# Patient Record
Sex: FEMALE | Race: WHITE | Employment: OTHER | URBAN - METROPOLITAN AREA
[De-identification: names, ages, dates, MRNs, and addresses within clinical notes are randomized per-mention and may not be internally consistent; named-entity substitution may affect disease eponyms.]

---

## 2022-09-09 ENCOUNTER — HOSPITAL ENCOUNTER (INPATIENT)
Age: 87
LOS: 6 days | Discharge: HOME | DRG: 177 | End: 2022-09-15
Attending: STUDENT IN AN ORGANIZED HEALTH CARE EDUCATION/TRAINING PROGRAM | Admitting: INTERNAL MEDICINE
Payer: COMMERCIAL

## 2022-09-09 ENCOUNTER — APPOINTMENT (OUTPATIENT)
Dept: GENERAL RADIOLOGY | Age: 87
DRG: 177 | End: 2022-09-09
Payer: COMMERCIAL

## 2022-09-09 DIAGNOSIS — U07.1 COVID: Primary | ICD-10-CM

## 2022-09-09 DIAGNOSIS — I50.9 ACUTE ON CHRONIC CONGESTIVE HEART FAILURE, UNSPECIFIED HEART FAILURE TYPE (HCC): ICD-10-CM

## 2022-09-09 PROBLEM — D72.829 LEUKOCYTOSIS: Status: ACTIVE | Noted: 2022-01-01

## 2022-09-09 PROBLEM — R79.89 ELEVATED SERUM CREATININE: Status: ACTIVE | Noted: 2022-01-01

## 2022-09-09 PROBLEM — R79.9 ELEVATED BUN: Status: ACTIVE | Noted: 2022-01-01

## 2022-09-09 PROBLEM — I50.41 ACUTE COMBINED SYSTOLIC AND DIASTOLIC CHF, NYHA CLASS 2 (HCC): Status: ACTIVE | Noted: 2022-01-01

## 2022-09-09 PROBLEM — D64.9 NORMOCYTIC NORMOCHROMIC ANEMIA: Status: ACTIVE | Noted: 2022-01-01

## 2022-09-09 LAB
A/G RATIO: 0.9 (ref 1.1–2.2)
ALBUMIN SERPL-MCNC: 3.4 G/DL (ref 3.4–5)
ALP BLD-CCNC: 228 U/L (ref 40–129)
ALT SERPL-CCNC: 21 U/L (ref 10–40)
ANION GAP SERPL CALCULATED.3IONS-SCNC: 16 MMOL/L (ref 3–16)
AST SERPL-CCNC: 28 U/L (ref 15–37)
BASE EXCESS VENOUS: -1.3 MMOL/L (ref -2–3)
BASOPHILS ABSOLUTE: 0 K/UL (ref 0–0.2)
BASOPHILS RELATIVE PERCENT: 0 %
BILIRUB SERPL-MCNC: 0.5 MG/DL (ref 0–1)
BUN BLDV-MCNC: 60 MG/DL (ref 7–20)
CALCIUM SERPL-MCNC: 9 MG/DL (ref 8.3–10.6)
CARBOXYHEMOGLOBIN: 1.2 % (ref 0–1.5)
CHLORIDE BLD-SCNC: 105 MMOL/L (ref 99–110)
CO2: 22 MMOL/L (ref 21–32)
CREAT SERPL-MCNC: 1.8 MG/DL (ref 0.6–1.2)
EKG ATRIAL RATE: 80 BPM
EKG DIAGNOSIS: NORMAL
EKG P AXIS: 43 DEGREES
EKG P-R INTERVAL: 174 MS
EKG Q-T INTERVAL: 394 MS
EKG QRS DURATION: 120 MS
EKG QTC CALCULATION (BAZETT): 454 MS
EKG R AXIS: -61 DEGREES
EKG T AXIS: 82 DEGREES
EKG VENTRICULAR RATE: 80 BPM
EOSINOPHILS ABSOLUTE: 0 K/UL (ref 0–0.6)
EOSINOPHILS RELATIVE PERCENT: 0 %
FERRITIN: 5347 NG/ML (ref 15–150)
GFR AFRICAN AMERICAN: 31
GFR NON-AFRICAN AMERICAN: 26
GLUCOSE BLD-MCNC: 124 MG/DL (ref 70–99)
HCO3 VENOUS: 24.1 MMOL/L (ref 24–28)
HCT VFR BLD CALC: 26.9 % (ref 36–48)
HCT VFR BLD CALC: 32.6 % (ref 36–48)
HEMOGLOBIN, VEN, REDUCED: 26.1 %
HEMOGLOBIN: 8.9 G/DL (ref 12–16)
IMMATURE RETIC FRACT: 0.5 (ref 0.21–0.37)
INFLUENZA A: NOT DETECTED
INFLUENZA B: NOT DETECTED
LACTATE DEHYDROGENASE: 375 U/L (ref 100–190)
LV EF: 63 %
LVEF MODALITY: NORMAL
LYMPHOCYTES ABSOLUTE: 0.8 K/UL (ref 1–5.1)
LYMPHOCYTES RELATIVE PERCENT: 4 %
MCH RBC QN AUTO: 32 PG (ref 26–34)
MCHC RBC AUTO-ENTMCNC: 33.1 G/DL (ref 31–36)
MCV RBC AUTO: 96.9 FL (ref 80–100)
METHEMOGLOBIN VENOUS: 0.5 % (ref 0–1.5)
MONOCYTES ABSOLUTE: 1 K/UL (ref 0–1.3)
MONOCYTES RELATIVE PERCENT: 5 %
NEUTROPHILS ABSOLUTE: 18.8 K/UL (ref 1.7–7.7)
NEUTROPHILS RELATIVE PERCENT: 91 %
O2 SAT, VEN: 73 %
PCO2, VEN: 42.4 MMHG (ref 41–51)
PDW BLD-RTO: 14.4 % (ref 12.4–15.4)
PH VENOUS: 7.36 (ref 7.35–7.45)
PLATELET # BLD: 310 K/UL (ref 135–450)
PMV BLD AUTO: 8.4 FL (ref 5–10.5)
PO2, VEN: 41.5 MMHG (ref 25–40)
POTASSIUM REFLEX MAGNESIUM: 4 MMOL/L (ref 3.5–5.1)
PRO-BNP: 3919 PG/ML (ref 0–449)
RBC # BLD: 2.78 M/UL (ref 4–5.2)
RBC # BLD: NORMAL 10*6/UL
RETICULOCYTE ABSOLUTE COUNT: 0.04 M/UL (ref 0.02–0.1)
RETICULOCYTE COUNT PCT: 1.08 % (ref 0.5–2.18)
SARS-COV-2 RNA, RT PCR: DETECTED
SODIUM BLD-SCNC: 143 MMOL/L (ref 136–145)
TCO2 CALC VENOUS: 25 MMOL/L
TOTAL PROTEIN: 7.1 G/DL (ref 6.4–8.2)
TROPONIN: 0.03 NG/ML
TROPONIN: 0.03 NG/ML
WBC # BLD: 20.7 K/UL (ref 4–11)

## 2022-09-09 PROCEDURE — 85025 COMPLETE CBC W/AUTO DIFF WBC: CPT

## 2022-09-09 PROCEDURE — 83540 ASSAY OF IRON: CPT

## 2022-09-09 PROCEDURE — 71045 X-RAY EXAM CHEST 1 VIEW: CPT

## 2022-09-09 PROCEDURE — 83921 ORGANIC ACID SINGLE QUANT: CPT

## 2022-09-09 PROCEDURE — 85045 AUTOMATED RETICULOCYTE COUNT: CPT

## 2022-09-09 PROCEDURE — 94761 N-INVAS EAR/PLS OXIMETRY MLT: CPT

## 2022-09-09 PROCEDURE — 6360000002 HC RX W HCPCS: Performed by: STUDENT IN AN ORGANIZED HEALTH CARE EDUCATION/TRAINING PROGRAM

## 2022-09-09 PROCEDURE — 2580000003 HC RX 258: Performed by: INTERNAL MEDICINE

## 2022-09-09 PROCEDURE — 80053 COMPREHEN METABOLIC PANEL: CPT

## 2022-09-09 PROCEDURE — 2700000000 HC OXYGEN THERAPY PER DAY

## 2022-09-09 PROCEDURE — 83880 ASSAY OF NATRIURETIC PEPTIDE: CPT

## 2022-09-09 PROCEDURE — 83550 IRON BINDING TEST: CPT

## 2022-09-09 PROCEDURE — 87636 SARSCOV2 & INF A&B AMP PRB: CPT

## 2022-09-09 PROCEDURE — 84484 ASSAY OF TROPONIN QUANT: CPT

## 2022-09-09 PROCEDURE — 82803 BLOOD GASES ANY COMBINATION: CPT

## 2022-09-09 PROCEDURE — 99285 EMERGENCY DEPT VISIT HI MDM: CPT

## 2022-09-09 PROCEDURE — 83615 LACTATE (LD) (LDH) ENZYME: CPT

## 2022-09-09 PROCEDURE — 1200000000 HC SEMI PRIVATE

## 2022-09-09 PROCEDURE — 93005 ELECTROCARDIOGRAM TRACING: CPT | Performed by: STUDENT IN AN ORGANIZED HEALTH CARE EDUCATION/TRAINING PROGRAM

## 2022-09-09 PROCEDURE — 36415 COLL VENOUS BLD VENIPUNCTURE: CPT

## 2022-09-09 PROCEDURE — 82728 ASSAY OF FERRITIN: CPT

## 2022-09-09 PROCEDURE — 99222 1ST HOSP IP/OBS MODERATE 55: CPT | Performed by: INTERNAL MEDICINE

## 2022-09-09 PROCEDURE — 93306 TTE W/DOPPLER COMPLETE: CPT

## 2022-09-09 RX ORDER — ONDANSETRON 4 MG/1
4 TABLET, ORALLY DISINTEGRATING ORAL EVERY 8 HOURS PRN
Status: DISCONTINUED | OUTPATIENT
Start: 2022-09-09 | End: 2022-09-15 | Stop reason: HOSPADM

## 2022-09-09 RX ORDER — BENZONATATE 100 MG/1
100 CAPSULE ORAL 3 TIMES DAILY PRN
Status: DISCONTINUED | OUTPATIENT
Start: 2022-09-09 | End: 2022-09-15 | Stop reason: HOSPADM

## 2022-09-09 RX ORDER — LOPERAMIDE HYDROCHLORIDE 2 MG/1
2 CAPSULE ORAL EVERY 4 HOURS PRN
COMMUNITY

## 2022-09-09 RX ORDER — ACETAMINOPHEN 650 MG/1
650 SUPPOSITORY RECTAL EVERY 6 HOURS PRN
Status: DISCONTINUED | OUTPATIENT
Start: 2022-09-09 | End: 2022-09-15 | Stop reason: HOSPADM

## 2022-09-09 RX ORDER — ACETAMINOPHEN 325 MG/1
650 TABLET ORAL 2 TIMES DAILY
COMMUNITY

## 2022-09-09 RX ORDER — IPRATROPIUM BROMIDE AND ALBUTEROL SULFATE 2.5; .5 MG/3ML; MG/3ML
1 SOLUTION RESPIRATORY (INHALATION) EVERY 4 HOURS PRN
COMMUNITY

## 2022-09-09 RX ORDER — ACETAMINOPHEN 325 MG/1
650 TABLET ORAL EVERY 6 HOURS PRN
Status: DISCONTINUED | OUTPATIENT
Start: 2022-09-09 | End: 2022-09-15 | Stop reason: HOSPADM

## 2022-09-09 RX ORDER — NIFEDIPINE 30 MG/1
30 TABLET, FILM COATED, EXTENDED RELEASE ORAL DAILY
COMMUNITY

## 2022-09-09 RX ORDER — FERROUS SULFATE 325(65) MG
325 TABLET ORAL
COMMUNITY

## 2022-09-09 RX ORDER — DOCUSATE SODIUM 100 MG/1
100 CAPSULE, LIQUID FILLED ORAL 2 TIMES DAILY
COMMUNITY

## 2022-09-09 RX ORDER — HEPARIN SODIUM 5000 [USP'U]/ML
5000 INJECTION, SOLUTION INTRAVENOUS; SUBCUTANEOUS EVERY 8 HOURS SCHEDULED
Status: DISCONTINUED | OUTPATIENT
Start: 2022-09-10 | End: 2022-09-15 | Stop reason: HOSPADM

## 2022-09-09 RX ORDER — FUROSEMIDE 10 MG/ML
20 INJECTION INTRAMUSCULAR; INTRAVENOUS ONCE
Status: COMPLETED | OUTPATIENT
Start: 2022-09-09 | End: 2022-09-09

## 2022-09-09 RX ORDER — LEVOTHYROXINE SODIUM 0.1 MG/1
100 TABLET ORAL DAILY
COMMUNITY

## 2022-09-09 RX ORDER — SODIUM CHLORIDE 9 MG/ML
INJECTION, SOLUTION INTRAVENOUS PRN
Status: DISCONTINUED | OUTPATIENT
Start: 2022-09-09 | End: 2022-09-15 | Stop reason: HOSPADM

## 2022-09-09 RX ORDER — ONDANSETRON 4 MG/1
4 TABLET, FILM COATED ORAL EVERY 8 HOURS PRN
COMMUNITY

## 2022-09-09 RX ORDER — ALPRAZOLAM 0.25 MG/1
0.25 TABLET ORAL 2 TIMES DAILY
COMMUNITY

## 2022-09-09 RX ORDER — SODIUM CHLORIDE 0.9 % (FLUSH) 0.9 %
5-40 SYRINGE (ML) INJECTION PRN
Status: DISCONTINUED | OUTPATIENT
Start: 2022-09-09 | End: 2022-09-15 | Stop reason: HOSPADM

## 2022-09-09 RX ORDER — GUAIFENESIN/DEXTROMETHORPHAN 100-10MG/5
5 SYRUP ORAL EVERY 4 HOURS PRN
Status: DISCONTINUED | OUTPATIENT
Start: 2022-09-09 | End: 2022-09-15 | Stop reason: HOSPADM

## 2022-09-09 RX ORDER — ECHINACEA PURPUREA EXTRACT 125 MG
1 TABLET ORAL EVERY 8 HOURS PRN
COMMUNITY

## 2022-09-09 RX ORDER — CHOLECALCIFEROL (VITAMIN D3) 125 MCG
5 CAPSULE ORAL NIGHTLY
COMMUNITY

## 2022-09-09 RX ORDER — DIPHENHYDRAMINE HCL 25 MG
25 TABLET ORAL EVERY 6 HOURS PRN
COMMUNITY

## 2022-09-09 RX ORDER — TRAMADOL HYDROCHLORIDE 50 MG/1
50 TABLET ORAL EVERY 8 HOURS PRN
COMMUNITY

## 2022-09-09 RX ORDER — FUROSEMIDE 20 MG/1
20 TABLET ORAL DAILY
COMMUNITY

## 2022-09-09 RX ORDER — SODIUM CHLORIDE 0.9 % (FLUSH) 0.9 %
5-40 SYRINGE (ML) INJECTION EVERY 12 HOURS SCHEDULED
Status: DISCONTINUED | OUTPATIENT
Start: 2022-09-09 | End: 2022-09-15 | Stop reason: HOSPADM

## 2022-09-09 RX ORDER — ONDANSETRON 2 MG/ML
4 INJECTION INTRAMUSCULAR; INTRAVENOUS EVERY 6 HOURS PRN
Status: DISCONTINUED | OUTPATIENT
Start: 2022-09-09 | End: 2022-09-15 | Stop reason: HOSPADM

## 2022-09-09 RX ORDER — POLYETHYLENE GLYCOL 3350 17 G/17G
17 POWDER, FOR SOLUTION ORAL DAILY PRN
Status: DISCONTINUED | OUTPATIENT
Start: 2022-09-09 | End: 2022-09-15 | Stop reason: HOSPADM

## 2022-09-09 RX ORDER — ENOXAPARIN SODIUM 100 MG/ML
30 INJECTION SUBCUTANEOUS DAILY
Status: DISCONTINUED | OUTPATIENT
Start: 2022-09-09 | End: 2022-09-09

## 2022-09-09 RX ADMIN — SODIUM CHLORIDE, PRESERVATIVE FREE 10 ML: 5 INJECTION INTRAVENOUS at 21:20

## 2022-09-09 RX ADMIN — FUROSEMIDE 20 MG: 10 INJECTION, SOLUTION INTRAMUSCULAR; INTRAVENOUS at 21:20

## 2022-09-09 ASSESSMENT — ENCOUNTER SYMPTOMS
CONSTIPATION: 0
COUGH: 1
SHORTNESS OF BREATH: 1
WHEEZING: 0
STRIDOR: 0
CHOKING: 0
VOMITING: 0
BACK PAIN: 0
DIARRHEA: 0

## 2022-09-09 ASSESSMENT — PAIN - FUNCTIONAL ASSESSMENT: PAIN_FUNCTIONAL_ASSESSMENT: NONE - DENIES PAIN

## 2022-09-09 NOTE — ED PROVIDER NOTES
[facility] RESIDENT NOTE       Date of evaluation: 9/9/2022    Chief Complaint     Shortness of Breath and Cough      of Present Illness     Ashley Ruiz is a 80 y.o. female, past medical history of hypertension, CHF, and hypothyroidism who presents to the emergency department via EMS from nursing home for evaluation of shortness of breath. Per EMS, patient has been experiencing shortness of breath for the last 2 to 3 days associated with a cough. Patient was noted to be hypoxic in the high 80s to low 90s on room air today, prompting presentation to the emergency department. On arrival, patient has a mild to moderately increased work of breathing but is unable to provide further history secondary to baseline mental status. Review of Systems   A complete review of systems was conducted on this patient and was negative except as noted in the HPI. Review of Systems   Constitutional:  Negative for chills and fever. Respiratory:  Positive for cough and shortness of breath. Negative for choking, wheezing and stridor. Cardiovascular:  Negative for chest pain. Gastrointestinal:  Negative for constipation, diarrhea and vomiting. Genitourinary:  Negative for frequency and hematuria. Musculoskeletal:  Negative for back pain and myalgias. Skin:  Negative for rash. Neurological:  Negative for seizures, syncope and facial asymmetry. Past Medical, Surgical, Family, and Social History     She has a past medical history of Anxiety, Arthritis, CHF (congestive heart failure) ([address] GERD (gastroesophageal reflux disease), Hyperlipidemia, Hypertension, and Thyroid disease. She has no past surgical history on file. Her family history is not on file. She reports that she does not currently use alcohol. She reports that she does not currently use drugs.     Medications     Previous Medications    No medications on file       Allergies     She has No Known Allergies. Physical Exam     INITIAL VITALS: BP: (!) 121/59, Temp: 98.9 °F (37.2 °C), Heart Rate: 79, Resp: 21, SpO2: 90 %   Physical Exam  Vitals and nursing note reviewed. Constitutional:       General: She is not in acute distress. Appearance: Normal appearance. She is normal weight. She is not ill-appearing or toxic-appearing. HENT:      Head: Normocephalic and atraumatic. Mouth/Throat:      Mouth: Mucous membranes are moist.   Eyes:      Extraocular Movements: Extraocular movements intact. Conjunctiva/sclera: Conjunctivae normal.   Cardiovascular:      Rate and Rhythm: Normal rate and regular rhythm. Pulses: Normal pulses. Heart sounds: Normal heart sounds. Pulmonary:      Effort: No respiratory distress. Breath sounds: No stridor. Rales (throughout bilateral lung fields) present. No wheezing. Chest:      Chest wall: No tenderness. Abdominal:      General: There is no distension. Palpations: Abdomen is soft. Tenderness: There is no abdominal tenderness. There is no guarding or rebound. Musculoskeletal:         General: Normal range of motion. Cervical back: Normal range of motion and neck supple. No rigidity. Skin:     General: Skin is warm and dry. Capillary Refill: Capillary refill takes less than 2 seconds. Findings: No rash. Neurological:      General: No focal deficit present. Mental Status: She is alert. Mental status is at baseline. DiagnosticResults     EKG   Interpreted in conjunction with emergencydepartment physician Sia Cross MD  Rhythm: normal sinus   Rate: normal  Axis: left  Ectopy: premature ventricular contractions (infrequent)  Conduction: normal  ST Segments: nonspecific changes  T Waves:elevation in  v2  Q Waves: none  Clinical Impression: non-specific EKG  Comparison:  No previous available for comparison    RADIOLOGY:  XR CHEST PORTABLE   Final Result   1. Pulmonary edema.    2.  Small left pleural effusion. 3.  Age-indeterminate fractures of left ribs 8 and 9. In correlation with point tenderness.           LABS:   Results for orders placed or performed during the hospital encounter of 09/09/22   COVID-19 & Influenza Combo    Specimen: Nasopharyngeal Swab   Result Value Ref Range    SARS-CoV-2 RNA, RT PCR DETECTED (A) NOT DETECTED    INFLUENZA A NOT DETECTED NOT DETECTED    INFLUENZA B NOT DETECTED NOT DETECTED   CBC with Auto Differential   Result Value Ref Range    WBC 20.7 (H) 4.0 - 11.0 K/uL    RBC 2.78 (L) 4.00 - 5.20 M/uL    Hemoglobin 8.9 (L) 12.0 - 16.0 g/dL    Hematocrit 26.9 (L) 36.0 - 48.0 %    MCV 96.9 80.0 - 100.0 fL    MCH 32.0 26.0 - 34.0 pg    MCHC 33.1 31.0 - 36.0 g/dL    RDW 14.4 12.4 - 15.4 %    Platelets 392 845 - 109 K/uL    MPV 8.4 5.0 - 10.5 fL    Neutrophils % 91.0 %    Lymphocytes % 4.0 %    Monocytes % 5.0 %    Eosinophils % 0.0 %    Basophils % 0.0 %    Neutrophils Absolute 18.8 (H) 1.7 - 7.7 K/uL    Lymphocytes Absolute 0.8 (L) 1.0 - 5.1 K/uL    Monocytes Absolute 1.0 0.0 - 1.3 K/uL    Eosinophils Absolute 0.0 0.0 - 0.6 K/uL    Basophils Absolute 0.0 0.0 - 0.2 K/uL    RBC Morphology Normal    CMP w/ Reflex to MG   Result Value Ref Range    Sodium 143 136 - 145 mmol/L    Potassium reflex Magnesium 4.0 3.5 - 5.1 mmol/L    Chloride 105 99 - 110 mmol/L    CO2 22 21 - 32 mmol/L    Anion Gap 16 3 - 16    Glucose 124 (H) 70 - 99 mg/dL    BUN 60 (H) 7 - 20 mg/dL    Creatinine 1.8 (H) 0.6 - 1.2 mg/dL    GFR Non-African American 26 (A) >60    GFR  31 (A) >60    Calcium 9.0 8.3 - 10.6 mg/dL    Total Protein 7.1 6.4 - 8.2 g/dL    Albumin 3.4 3.4 - 5.0 g/dL    Albumin/Globulin Ratio 0.9 (L) 1.1 - 2.2    Total Bilirubin 0.5 0.0 - 1.0 mg/dL    Alkaline Phosphatase 228 (H) 40 - 129 U/L    ALT 21 10 - 40 U/L    AST 28 15 - 37 U/L   Troponin   Result Value Ref Range    Troponin 0.03 (H) <0.01 ng/mL   Blood gas, venous (Lab)   Result Value Ref Range    pH, [place] 7.362 7.350 - 7.450 pCO2, [place] 42.4 41.0 - 51.0 mmHg    pO2, [place] 41.5 (H) 25.0 - 40.0 mmHg    HCO3, Venous 24.1 24.0 - 28.0 mmol/L    Base Excess, [place] -1.3 -2.0 - 3.0 mmol/L    O2 Sat, [place] 73 Not established %    Carboxyhemoglobin 1.2 0.0 - 1.5 %    MetHgb, [place] 0.5 0.0 - 1.5 %    TC02 (Calc), [place] 25 mmol/L    Hemoglobin, [place], Reduced 26.10 %   EKG 12 Lead   Result Value Ref Range    Ventricular Rate 80 BPM    Atrial Rate 80 BPM    P-R Interval 174 ms    QRS Duration 120 ms    Q-T Interval 394 ms    QTc Calculation (Bazett) 454 ms    P Axis 43 degrees    R Axis -61 degrees    T Axis 82 degrees    Diagnosis       EKG performed in ER and to be interpreted by ER physician. Confirmed by MD, ER ([id number]),  [address], [address] [place] [address] ([id number]) on 9/9/2022 12:21:58 PM         RECENT VITALS:  BP: (!) 119/57, Temp: 98.8 °F (37.1 °C), Heart Rate: 74,Resp: 21, SpO2: 100 %     ED Course     Nursing Notes, Past Medical Hx, Past Surgical Hx, Social Hx, Allergies, and Family Hx were reviewed.     The patient was given the followingmedications:  Orders Placed This Encounter   Medications    furosemide (LASIX) injection 20 mg    sodium chloride flush 0.9 % injection 5-40 mL    sodium chloride flush 0.9 % injection 5-40 mL    0.9 % sodium chloride infusion    enoxaparin Sodium (LOVENOX) injection 30 mg     Order Specific Question:   Indication of Use     Answer:   Prophylaxis-DVT/PE    OR Linked Order Group     ondansetron (ZOFRAN-ODT) disintegrating tablet 4 mg     ondansetron (ZOFRAN) injection 4 mg    polyethylene glycol (GLYCOLAX) packet 17 g    OR Linked Order Group     acetaminophen (TYLENOL) tablet 650 mg     acetaminophen (TYLENOL) suppository 650 mg    guaiFENesin-dextromethorphan (ROBITUSSIN DM) 100-10 MG/5ML syrup 5 mL       CONSULTS:  IP CONSULT TO HOSPITALIST  IP CONSULT TO [address] [place] [address] / MARCIA / Billy Kaba is a 80 y.o. female ast medical history of hypertension, CHF, and hypothyroidism presenting to emergency

## 2022-09-09 NOTE — H&P
Hospital Medicine History & Physical      PCP: Syeda Ghosh MD    Date of Admission: 9/9/2022    Date of Service: Pt seen/examined on 09/09/22 and Admitted to Inpatient with expected LOS greater than two midnights due to medical therapy. Chief Complaint: Shortness of breath and cough for about 2 to 3 days duration. History Of Present Illness: Rickie Sandifer is a 80 y.o. female with a past medical history of hypertension, hypothyroidism, and congestive heart failure, nursing home resident, presented to the [facility] emergency department by EMS from [facility] home for evaluation of shortness of breath. Reportedly patient has been experiencing difficulty breathing and shortness of breath about 2 to 3 days prior to admission, persistent, associated with cough, persistent cough, dry, no associated pain. Upon presentation she was noted to be hypoxic in the 80s to 90s on room air. Chest x-ray on presentation showed evidence of pulmonary edema, as such her hypoxemia was attributed to fluid overload. Other positive findings on presentation, positive for COVID-19 infection, elevated creatinine and BUN, bilirubinemia and leukocytosis. Admitted for further evaluation and management. Patient is very hard of hearing, unable to provide history. Past Medical History:          Diagnosis Date    Anxiety     Arthritis     CHF (congestive heart failure) (HCC)     GERD (gastroesophageal reflux disease)     Hyperlipidemia     Hypertension     Thyroid disease        Past Surgical History:      History reviewed. No pertinent surgical history. Medications Prior to Admission:      Prior to Admission medications    Not on File       Allergies:  Patient has no known allergies. Social History:      The patient currently lives in the nursing home    TOBACCO:   has no history on file for tobacco use. ETOH:   reports that she does not currently use alcohol.   E-cigarette/Vaping       Questions Responses E-cigarette/Vaping Use     Start Date     Passive Exposure     Quit Date     Counseling Given     Comments               Family History:      Attempted to review however unable to obtain, will not be pertinent to her current presentation. History reviewed. No pertinent family history. REVIEW OF SYSTEMS COMPLETED:   Unable to obtain    PHYSICAL EXAM PERFORMED:    BP (!) 119/57   Pulse 74   Temp 98.8 °F (37.1 °C) (Oral)   Resp 21   Ht 5' 6\" (1.676 m)   Wt 126 lb (57.2 kg)   SpO2 100%   BMI 20.34 kg/m²     General appearance: Patient is very hard of hearing, ill looking, frail and cachectic. HEENT:  Normal cephalic, atraumatic without obvious deformity. Extra ocular muscles intact. Conjunctivae/corneas clear. Neck: Supple, with full range of motion. No jugular venous distention. Trachea midline. Respiratory: Increased work of breathing, was coughing continuously upon my evaluation. Decreased air entry bilaterally. No wheezing. Cardiovascular:  Regular rate and rhythm with normal S1/S2 without murmurs, rubs or gallops. Abdomen: Soft, non-tender, non-distended with normal bowel sounds. Musculoskeletal:  No clubbing, cyanosis or edema bilaterally. Full range of motion without deformity. Skin: Skin color, texture, turgor normal.  No rashes or lesions. Neurologic: no Lateralizing signs on exam.. Psychiatric:  Alert not oriented,     Labs:     Recent Labs     09/09/22  0933   WBC 20.7*   HGB 8.9*   HCT 26.9*        Recent Labs     09/09/22  0933      K 4.0      CO2 22   BUN 60*   CREATININE 1.8*   CALCIUM 9.0     Recent Labs     09/09/22  0933   AST 28   ALT 21   BILITOT 0.5   ALKPHOS 228*     No results for input(s): INR in the last 72 hours.   Recent Labs     09/09/22  0933   TROPONINI 0.03*       Urinalysis:    No results found for: Hollie Lawson, BACTERIA, RBCUA, BLOODU, [address], [address]    Radiology:     CXR: I have reviewed the CXR with the following interpretation: Midline trachea, cephalization of pulmonary vasculature, no consolidation, small left pleural effusion, findings consistent with pulmonary edema. EKG:  I have reviewed the EKG with the following interpretation: Normal sinus rhythm, at a rate of 80 bpm. Normal QTc interval, PVCs noted. Left axis deviation. No ST elevation osteomyelitis. XR CHEST PORTABLE   Final Result   1. Pulmonary edema. 2.  Small left pleural effusion. 3.  Age-indeterminate fractures of left ribs 8 and 9. In correlation with point tenderness. US ABDOMEN LIMITED    (Results Pending)       Consults:    IP CONSULT TO HOSPITALIST  IP CONSULT TO CARDIOLOGY    ASSESSMENT:    25-year-old  lady with a past medical history as above, admitted with the following problems. Active Hospital Problems    Diagnosis Date Noted    Acute combined systolic and diastolic CHF, NYHA class 2 ([facility] [address] [I50.41] 09/09/2022     Priority: Medium    Acute COVID-19 [U07.1] 09/09/2022     Priority: Medium    Elevated BUN [R79.9] 09/09/2022     Priority: Medium    Elevated serum creatinine [R79.89] 09/09/2022     Priority: Medium    Normocytic normochromic anemia [D64.9] 09/09/2022     Priority: Medium    Leukocytosis [D72.829] 09/09/2022     Priority: Medium       PLAN:    #Acute hypoxic respiratory failure, likely due to acute exacerbation of chronic diastolic congestive heart failure, heart failure with preserved ejection fraction. -Given 1 dose of IV Lasix in the emergency department.  -We will hold further diuresis for now to assess kidney function response and volume status    #Elevated serum creatinine and BUN, with elevated BUN/creatinine ratio, no previous records to compare. Concern for possible chronic kidney disease, however unable to rule out TUTU. -Check renal ultrasound.  -Avoid further diuresis for now. #Acute COVID 19 infection.  -Hypoxemia improved with diuresis.   -Symptomatic control with antitussives and antipyretics  -Isolation. #Normocytic normochromic anemia. Most likely due to anemia of chronic kidney disease. However given elevated BUN/creatinine ratio, will check reticulocyte count, iron studies, and fecal occult blood test.    #Leukocytosis, with neutrophil predominance. Likely reactive. Will monitor. #Elevated troponin, mild, in the setting of volume overload, acute COVID infection and abnormal kidney function.  -We will trend      DVT Prophylaxis: Heparin subcu  Diet: ADULT DIET; Regular  Code Status: Full Code    PT/OT Eval Status: To be determined    Dispo -inpatient    High risk for complication given advanced age, acute COVID infection, and abnormal kidney function.      Garrison Velasquez MD

## 2022-09-09 NOTE — ED PROVIDER NOTES
ED Attending Attestation Note     Date of evaluation: 9/9/2022    This patient was seen by the resident. I have seen and examined the patient, agree with the workup, evaluation, management and diagnosis. The care plan has been discussed. Briefly, this is a patient who presents from her nursing home with concerns for a cough and some possible shortness of breath. She has not been COVID tested. On my evaluation, she is calm and in no acute distress. She has mild supraclavicular retractions but otherwise no significant increased work of breathing. Her lung sounds are noted to have crackles at the bilateral cases without wheezing.        Shirley Ng MD  09/10/22 9737

## 2022-09-09 NOTE — PROGRESS NOTES
4 Eyes Admission Assessment     I agree as the admission nurse that 2 RN's have performed a thorough Head to Toe Skin Assessment on the patient. ALL assessment sites listed below have been assessed on admission. Areas assessed by both nurses:   [x]   Head, Face, and Ears   [x]   Shoulders, Back, and Chest  [x]   Arms, Elbows, and Hands   [x]   Coccyx, Sacrum, and Ischum: purple bruise on buttocks  [x]   Legs, Feet, and Heels: anival heels red and blanchable:  purple bruise right upper calf        Does the Patient have Skin Breakdown?   No         Vu Prevention initiated:  Yes   Wound Care Orders initiated:  No      [facility] nurse consulted for Pressure Injury (Stage 3,4, Unstageable, DTI, NWPT, and Complex wounds):  No      Nurse 1 eSignature: Electronically signed by Khushi Lopes RN on 9/9/22 at 6:55 PM EDT    **SHARE this note so that the co-signing nurse is able to place an eSignature**    Nurse 2 eSignature: Electronically signed by Jelani Glass RN on 9/9/22 at 7:19 PM EDT

## 2022-09-10 LAB
A/G RATIO: 0.8 (ref 1.1–2.2)
ALBUMIN SERPL-MCNC: 3.3 G/DL (ref 3.4–5)
ALP BLD-CCNC: 222 U/L (ref 40–129)
ALT SERPL-CCNC: 19 U/L (ref 10–40)
ANION GAP SERPL CALCULATED.3IONS-SCNC: 22 MMOL/L (ref 3–16)
ANISOCYTOSIS: ABNORMAL
AST SERPL-CCNC: 25 U/L (ref 15–37)
BANDED NEUTROPHILS RELATIVE PERCENT: 5 % (ref 0–7)
BASOPHILS ABSOLUTE: 0 K/UL (ref 0–0.2)
BASOPHILS RELATIVE PERCENT: 0 %
BILIRUB SERPL-MCNC: 0.5 MG/DL (ref 0–1)
BUN BLDV-MCNC: 59 MG/DL (ref 7–20)
CALCIUM SERPL-MCNC: 9.3 MG/DL (ref 8.3–10.6)
CHLORIDE BLD-SCNC: 104 MMOL/L (ref 99–110)
CO2: 18 MMOL/L (ref 21–32)
CREAT SERPL-MCNC: 1.6 MG/DL (ref 0.6–1.2)
D DIMER: 4.32 UG/ML FEU (ref 0–0.6)
EOSINOPHILS ABSOLUTE: 0 K/UL (ref 0–0.6)
EOSINOPHILS RELATIVE PERCENT: 0 %
GFR AFRICAN AMERICAN: 36
GFR NON-AFRICAN AMERICAN: 30
GLUCOSE BLD-MCNC: 106 MG/DL (ref 70–99)
HCT VFR BLD CALC: 26.9 % (ref 36–48)
HEMOGLOBIN: 8.8 G/DL (ref 12–16)
IRON SATURATION: 41 % (ref 15–50)
IRON: 49 UG/DL (ref 37–145)
LYMPHOCYTES ABSOLUTE: 2.1 K/UL (ref 1–5.1)
LYMPHOCYTES RELATIVE PERCENT: 10 %
MCH RBC QN AUTO: 31.5 PG (ref 26–34)
MCHC RBC AUTO-ENTMCNC: 32.9 G/DL (ref 31–36)
MCV RBC AUTO: 95.6 FL (ref 80–100)
METAMYELOCYTES RELATIVE PERCENT: 2 %
MONOCYTES ABSOLUTE: 0.2 K/UL (ref 0–1.3)
MONOCYTES RELATIVE PERCENT: 1 %
MYELOCYTE PERCENT: 1 %
NEUTROPHILS ABSOLUTE: 18.6 K/UL (ref 1.7–7.7)
NEUTROPHILS RELATIVE PERCENT: 81 %
PDW BLD-RTO: 14.1 % (ref 12.4–15.4)
PLATELET # BLD: 320 K/UL (ref 135–450)
PMV BLD AUTO: 8.5 FL (ref 5–10.5)
POIKILOCYTES: ABNORMAL
POTASSIUM REFLEX MAGNESIUM: 3.7 MMOL/L (ref 3.5–5.1)
PRO-BNP: 4019 PG/ML (ref 0–449)
RBC # BLD: 2.81 M/UL (ref 4–5.2)
SODIUM BLD-SCNC: 144 MMOL/L (ref 136–145)
TOTAL IRON BINDING CAPACITY: 120 UG/DL (ref 260–445)
TOTAL PROTEIN: 7.2 G/DL (ref 6.4–8.2)
TROPONIN: 0.03 NG/ML
VITAMIN D 25-HYDROXY: 54.9 NG/ML
WBC # BLD: 20.9 K/UL (ref 4–11)

## 2022-09-10 PROCEDURE — 85379 FIBRIN DEGRADATION QUANT: CPT

## 2022-09-10 PROCEDURE — 6370000000 HC RX 637 (ALT 250 FOR IP): Performed by: INTERNAL MEDICINE

## 2022-09-10 PROCEDURE — 6360000002 HC RX W HCPCS: Performed by: INTERNAL MEDICINE

## 2022-09-10 PROCEDURE — 1200000000 HC SEMI PRIVATE

## 2022-09-10 PROCEDURE — 36415 COLL VENOUS BLD VENIPUNCTURE: CPT

## 2022-09-10 PROCEDURE — 83880 ASSAY OF NATRIURETIC PEPTIDE: CPT

## 2022-09-10 PROCEDURE — 82306 VITAMIN D 25 HYDROXY: CPT

## 2022-09-10 PROCEDURE — 85025 COMPLETE CBC W/AUTO DIFF WBC: CPT

## 2022-09-10 PROCEDURE — 99232 SBSQ HOSP IP/OBS MODERATE 35: CPT | Performed by: INTERNAL MEDICINE

## 2022-09-10 PROCEDURE — 84484 ASSAY OF TROPONIN QUANT: CPT

## 2022-09-10 PROCEDURE — 80053 COMPREHEN METABOLIC PANEL: CPT

## 2022-09-10 PROCEDURE — XW033E5 INTRODUCTION OF REMDESIVIR ANTI-INFECTIVE INTO PERIPHERAL VEIN, PERCUTANEOUS APPROACH, NEW TECHNOLOGY GROUP 5: ICD-10-PCS | Performed by: INTERNAL MEDICINE

## 2022-09-10 PROCEDURE — 2580000003 HC RX 258: Performed by: INTERNAL MEDICINE

## 2022-09-10 PROCEDURE — 92610 EVALUATE SWALLOWING FUNCTION: CPT

## 2022-09-10 RX ORDER — IPRATROPIUM BROMIDE AND ALBUTEROL SULFATE 2.5; .5 MG/3ML; MG/3ML
1 SOLUTION RESPIRATORY (INHALATION) EVERY 4 HOURS PRN
Status: DISCONTINUED | OUTPATIENT
Start: 2022-09-10 | End: 2022-09-15 | Stop reason: HOSPADM

## 2022-09-10 RX ORDER — 0.9 % SODIUM CHLORIDE 0.9 %
30 INTRAVENOUS SOLUTION INTRAVENOUS PRN
Status: DISCONTINUED | OUTPATIENT
Start: 2022-09-10 | End: 2022-09-15 | Stop reason: HOSPADM

## 2022-09-10 RX ORDER — NIFEDIPINE 30 MG/1
30 TABLET, FILM COATED, EXTENDED RELEASE ORAL DAILY
Status: DISCONTINUED | OUTPATIENT
Start: 2022-09-11 | End: 2022-09-11

## 2022-09-10 RX ORDER — ALPRAZOLAM 0.25 MG/1
0.25 TABLET ORAL 2 TIMES DAILY
Status: DISCONTINUED | OUTPATIENT
Start: 2022-09-10 | End: 2022-09-15 | Stop reason: HOSPADM

## 2022-09-10 RX ORDER — TRAMADOL HYDROCHLORIDE 50 MG/1
50 TABLET ORAL 3 TIMES DAILY
Status: DISCONTINUED | OUTPATIENT
Start: 2022-09-10 | End: 2022-09-15 | Stop reason: HOSPADM

## 2022-09-10 RX ORDER — LEVOTHYROXINE SODIUM 0.1 MG/1
100 TABLET ORAL DAILY
Status: DISCONTINUED | OUTPATIENT
Start: 2022-09-10 | End: 2022-09-15 | Stop reason: HOSPADM

## 2022-09-10 RX ORDER — DOCUSATE SODIUM 100 MG/1
100 CAPSULE, LIQUID FILLED ORAL 2 TIMES DAILY
Status: DISCONTINUED | OUTPATIENT
Start: 2022-09-10 | End: 2022-09-15 | Stop reason: HOSPADM

## 2022-09-10 RX ORDER — MECOBALAMIN 5000 MCG
5 TABLET,DISINTEGRATING ORAL NIGHTLY
Status: DISCONTINUED | OUTPATIENT
Start: 2022-09-10 | End: 2022-09-15 | Stop reason: HOSPADM

## 2022-09-10 RX ORDER — DIPHENHYDRAMINE HCL 25 MG
25 TABLET ORAL EVERY 6 HOURS PRN
Status: DISCONTINUED | OUTPATIENT
Start: 2022-09-10 | End: 2022-09-15 | Stop reason: HOSPADM

## 2022-09-10 RX ORDER — FERROUS SULFATE 325(65) MG
325 TABLET ORAL
Status: DISCONTINUED | OUTPATIENT
Start: 2022-09-10 | End: 2022-09-15 | Stop reason: HOSPADM

## 2022-09-10 RX ORDER — FUROSEMIDE 20 MG/1
20 TABLET ORAL DAILY
Status: DISCONTINUED | OUTPATIENT
Start: 2022-09-10 | End: 2022-09-11

## 2022-09-10 RX ADMIN — TRAMADOL HYDROCHLORIDE 50 MG: 50 TABLET, COATED ORAL at 18:45

## 2022-09-10 RX ADMIN — SODIUM CHLORIDE, PRESERVATIVE FREE 10 ML: 5 INJECTION INTRAVENOUS at 18:59

## 2022-09-10 RX ADMIN — HEPARIN SODIUM 5000 UNITS: 5000 INJECTION INTRAVENOUS; SUBCUTANEOUS at 06:24

## 2022-09-10 RX ADMIN — ALPRAZOLAM 0.25 MG: 0.25 TABLET ORAL at 18:44

## 2022-09-10 RX ADMIN — GUAIFENESIN SYRUP AND DEXTROMETHORPHAN 5 ML: 100; 10 SYRUP ORAL at 18:45

## 2022-09-10 RX ADMIN — BENZONATATE 100 MG: 100 CAPSULE ORAL at 11:58

## 2022-09-10 RX ADMIN — HEPARIN SODIUM 5000 UNITS: 5000 INJECTION INTRAVENOUS; SUBCUTANEOUS at 21:07

## 2022-09-10 RX ADMIN — FERROUS SULFATE TAB 325 MG (65 MG ELEMENTAL FE) 325 MG: 325 (65 FE) TAB at 18:45

## 2022-09-10 RX ADMIN — ALPRAZOLAM 0.25 MG: 0.25 TABLET ORAL at 21:07

## 2022-09-10 RX ADMIN — Medication 5 MG: at 21:06

## 2022-09-10 RX ADMIN — HEPARIN SODIUM 5000 UNITS: 5000 INJECTION INTRAVENOUS; SUBCUTANEOUS at 17:06

## 2022-09-10 RX ADMIN — SODIUM CHLORIDE, PRESERVATIVE FREE 10 ML: 5 INJECTION INTRAVENOUS at 21:07

## 2022-09-10 RX ADMIN — REMDESIVIR 200 MG: 100 INJECTION, POWDER, LYOPHILIZED, FOR SOLUTION INTRAVENOUS at 17:09

## 2022-09-10 RX ADMIN — TRAMADOL HYDROCHLORIDE 50 MG: 50 TABLET, COATED ORAL at 21:07

## 2022-09-10 RX ADMIN — GUAIFENESIN SYRUP AND DEXTROMETHORPHAN 5 ML: 100; 10 SYRUP ORAL at 11:58

## 2022-09-10 NOTE — DISCHARGE INSTRUCTIONS
Extra Heart Failure sites:   [email or web address]swiftQueue/ --- this is American Heart Association interactive Healthier Living with Heart Failure guidebook. Please copy and paste link into search bar. Use your mouse to scroll through the pages. Lots and lots of info / tips    HF Golden palomo  --- free smart phone palomo available for [employer] and [employer]. Use your phone to track sodium / fluid intake,  symptoms, weight, etc.    [employer] - website-- [employer] is a dialysis company. All dialysis patients follow a renal diet which IS low sodium!! This website offers free seasonal cookbooks.   Each quarter, they will release 25-30 new recipes with a breakdown of calories, sodium, glucose, etc    [email or web address] -- more free recipes

## 2022-09-10 NOTE — PLAN OF CARE
Problem: Skin/Tissue Integrity  Goal: Absence of new skin breakdown  Description: 1. Monitor for areas of redness and/or skin breakdown  2. Assess vascular access sites hourly  3. Every 4-6 hours minimum:  Change oxygen saturation probe site  4. Every 4-6 hours:  If on nasal continuous positive airway pressure, respiratory therapy assess nares and determine need for appliance change or resting period.   9/10/2022 0635 by Kristen Bermudez RN  Outcome: Progressing     Problem: ABCDS Injury Assessment  Goal: Absence of physical injury  9/10/2022 1434 by Fariba Mahan RN  Outcome: Progressing     Problem: Respiratory - Adult  Goal: Achieves optimal ventilation and oxygenation  9/10/2022 1434 by Fariba Mahan RN  Outcome: Progressing     Problem: Cardiovascular - Adult  Goal: Maintains optimal cardiac output and hemodynamic stability  9/10/2022 1434 by Fariba Mahan RN  Outcome: Progressing     Problem: Cardiovascular - Adult  Goal: Absence of cardiac dysrhythmias or at baseline  9/10/2022 1434 by Fariba Mahan RN  Outcome: Progressing     Problem: Metabolic/Fluid and Electrolytes - Adult  Goal: Electrolytes maintained within normal limits  9/10/2022 1434 by Fariba Mahan RN  Outcome: Progressing     Problem: Metabolic/Fluid and Electrolytes - Adult  Goal: Hemodynamic stability and optimal renal function maintained  9/10/2022 1434 by Fariba Mahan RN  Outcome: Progressing

## 2022-09-10 NOTE — CONSULTS
Cardiology Consultation/History and Physical                                                                  Pt Name: Manny Mark  Age: 80 y.o. Sex: female  : 1924  Location: [facility]5/4315-01    Referring Physician: Walker Zavala MD      Reason for Consult:     Reason for Consultation/Chief Complaint: sob    HPI:      Manny Mark is a 80 y.o. female with dementia that presented to ED with sob and hypoxia form nursing home. Unable to obtain medical history from the patient. Apparently, she was noted to have worsening sob and cough. WBC elevated, COVID positive        ECHO 2022  Technically difficult. Left ventricular cavity size is normal. Normal left ventricular wall thickness. Ejection fraction is visually estimated to be 60-65%. No regional wall motion abnormalities are noted. Mild tricuspid regurgitation. Mild eccentric mitral regurgitation is present. The left atrium is mildly dilated. IVC size is dilated (>2.1 cm) and collapses < 50% with respiration consistent with elevated RA pressure (15 mmHg)with and  estimated RSVP of 45mmHg. Histories     Past Medical History:   has a past medical history of Anxiety, Arthritis, CHF (congestive heart failure) ([address] GERD (gastroesophageal reflux disease), Hyperlipidemia, Hypertension, and Thyroid disease. Surgical History:   has no past surgical history on file. Social History:   reports that she does not currently use alcohol. She reports that she does not currently use drugs. Family History:  No evidence for sudden cardiac death or premature CAD      Medications:       Home Medications  Were reviewed and are listed in nursing record. and/or listed below  Prior to Admission medications    Medication Sig Start Date End Date Taking?  Authorizing Provider   acetaminophen (TYLENOL) 325 MG tablet Take 650 mg by mouth in the morning and at bedtime   Yes Historical Provider, MD   ALPRAZolam (XANAX) 0.25 MG tablet Take 0.25 mg by mouth in Historical Provider, MD   diclofenac sodium (VOLTAREN) 1 % GEL Apply 1 g topically 2 times daily as needed for Pain (right shoulder, elbow, and wrist)   Yes Historical Provider, MD          Inpatient Medications:   furosemide  20 mg IntraVENous Once    sodium chloride flush  5-40 mL IntraVENous 2 times per day    [START ON 9/10/2022] heparin (porcine)  5,000 Units SubCUTAneous 3 times per day       IV drips:   sodium chloride         PRN:  sodium chloride flush, sodium chloride, ondansetron **OR** ondansetron, polyethylene glycol, acetaminophen **OR** acetaminophen, guaiFENesin-dextromethorphan, benzonatate    Allergy:     Patient has no known allergies. Review of Systems:     Unable to perform due to underlying dementia, hearing loss    Physical Examination:     Vitals:    09/09/22 1705 09/09/22 1738 09/09/22 1800 09/09/22 1910   BP:  (!) 118/57 123/63 (!) 142/79   Pulse:  80 79 88   Resp:  22 24 23   Temp:  99 °F (37.2 °C)  97.8 °F (36.6 °C)   TempSrc:  Oral  Axillary   SpO2:  97%  93%   Weight: 126 lb (57.2 kg)      Height: 5' 6\" (1.676 m)          Wt Readings from Last 3 Encounters:   09/09/22 126 lb (57.2 kg)         General Appearance:  Alert, tachypneic  appears stated age    Head:  Normocephalic, without obvious abnormality, atraumatic   Eyes:  PERRL, conjunctiva/corneas clear EOM intact  Ears normal          Nose: Nares normal, no drainage or sinus tenderness   Throat: Lips, mucosa, and tongue normal   Neck: Supple, symmetrical, trachea midline, no adenopathy, thyroid: not enlarged, symmetric, no tenderness/mass/nodules, no carotid bruit.         Lungs:   Respirations labored, diminshed bs   Chest Wall:  No tenderness or deformity   Heart:  Regular rhythm, rate is controlled, S1, S2 normal, there is no murmur, there is no rub or gallop, cannot assess jvd, no bilateral lower extremity edema   Abdomen:   Soft, non-tender, bowel sounds active all four quadrants,  no masses, no organomegaly Extremities: Extremities normal, atraumatic, no cyanosis. Pulses: 2+ radial   Skin: Skin color, texture, turgor normal, no rashes or lesions   Pysch: Normal mood and affect   Neurologic: No focal deficits noted        Labs:     Recent Labs     09/09/22 0933      K 4.0   BUN 60*   CREATININE 1.8*      CO2 22   GLUCOSE 124*   CALCIUM 9.0     Recent Labs     09/09/22 0933   WBC 20.7*   HGB 8.9*   HCT 26.9*      MCV 96.9     No results for input(s): CHOLTOT, TRIG, HDL, CHOLHDL, LDL in the last 72 hours. Invalid input(s): Kalyani Wray  No results for input(s): PTT, INR in the last 72 hours. Invalid input(s): PT  Recent Labs     09/09/22 0933   TROPONINI 0.03*     No results for input(s): BNP in the last 72 hours. No results for input(s): TSH in the last 72 hours. No results for input(s): CHOL, HDL, LDLCALC, TRIG in the last 72 hours.]    Lab Results   Component Value Date    TROPONINI 0.03 (H) 09/09/2022     EKG nsr, anteroseptal mi age indeterminate    Imaging:       I have personally reviewed patient's ECG which shows EKG nsr, anteroseptal mi age indeterminate    Assessment / Plan:   Acute Hypoxemic respiratory failure  Leukocytosis  Pleural effusion  Diastolic HF vs COVID pneumonitis  CKD    Has received IV lasix  Tx of covid per hospitalist  Unfortunately, not a candidate for CTA and unable to perform VQ  US lower extremities to assess for DVT, d-dimer    I have personally reviewed the reports and images of labs, radiological studies, cardiac studies including ECG's and telemetry, current and old medical records. The note was completed using EMR and Dragon dictation system. Every effort was made to ensure accuracy; however, inadvertent computerized transcription errors may be present. I would like to thank you for providing me the opportunity to participate in the care of your patient. If you have any questions, please do not hesitate to contact me.      Rigoberto Garcia MD, [place] [address]  Ph: [phone]  Fax: [phone]

## 2022-09-10 NOTE — PROGRESS NOTES
Speech Language Pathology  Facility/Department: [place] [address] [facility]   CLINICAL BEDSIDE SWALLOW EVALUATION    NAME: Elizabeth Mooney  : 1924  MRN: [id number]    ADMISSION DATE: 2022  ADMITTING DIAGNOSIS: has Acute combined systolic and diastolic CHF, NYHA class 2 ([facility] [address] [address] Acute COVID-19; Elevated BUN; Elevated serum creatinine; Normocytic normochromic anemia; and Leukocytosis on their problem list.  ONSET DATE: 2022    Recent Chest Xray: (2022)  Impression   1. Pulmonary edema. 2.  Small left pleural effusion. 3.  Age-indeterminate fractures of left ribs 8 and 9. In correlation with point tenderness     Date of Eval: 9/10/2022  Evaluating Therapist: RICA Meadows    Current Diet level:  Current Diet : Regular      Primary Complaint  Patient Complaint: \"I want water. \"    Pain:  Pain Assessment  Pain Assessment: None - Denies Pain    Reason for Referral  Elizabeth Mooney was referred for a bedside swallow evaluation to assess the efficiency of her swallow function, identify signs and symptoms of aspiration and make recommendations regarding safe dietary consistencies, effective compensatory strategies, and safe eating environment. Prior Dysphagia History  None found in EMR. Per hard chart, was on regular texture diet and thin liquids at her facility. Impression  Dysphagia Diagnosis: Suspected needs further assessment  Dysphagia Impression : Needs further assessment. Oral motor exam limited by inconsistent direction following (pt very [place], confused). Limited dentition. Oral cavity appears moist, clean. With patient seated up in bed on room air, trialed ice chips, thins via tsp/cup/straw, puree. Pt with positive oral acceptance, positive swallow movement, good oral clearance. Inconsistent dry cough present (both with and without PO). Reduced with small, controlled straw sips.  Recommend frequent oral hygiene (x3 daily), and puree diet, thin liquids via straw (small sips only), monitoring tolerance. Recommend instrumental assessment as appropriate (unable at this time d/t weekend scheduling/staffing limitations). Will continue to follow. Dysphagia Outcome Severity Scale: Level 4: Mild moderate dysphagia- Intermittent supervision/cueing. One - two diet consistencies restricted     Treatment Plan  Requires SLP Intervention: Yes  Duration of Treatment: 1-2 wks or LOS  D/C Recommendations: Ongoing speech therapy is recommended during this hospitalization       Recommended Diet and Intervention  Puree + thin liquids via SMALL STRAW SIPS  Recommended Form of Meds: Crushed in puree as able  Recommendations: Dysphagia treatment; Modified barium swallow study  Therapeutic Interventions: Diet tolerance monitoring;Oral care; Patient/Family education    Compensatory Swallowing Strategies  Compensatory Swallowing Strategies : Upright as possible for all oral intake;Eat/Feed slowly; Alternate solids and liquids;Small bites/sips;Assist feed;Remain upright for 30-45 minutes after meals    Treatment/Goals  Short-term Goals  Timeframe for Short-term Goals: 1-2 wks or LOS  Goal 1: Patient will participate in further assessment of swallow function as appropriate. General  Chart Reviewed: Yes  Comments: Per admitting H&P (09/09/2022): 'Pricilla Jain is a 80 y.o. female with a past medical history of hypertension, hypothyroidism, and congestive heart failure, nursing home resident, presented to the [facility] emergency department by EMS from nursing home for evaluation of shortness of breath. Reportedly patient has been experiencing difficulty breathing and shortness of breath about 2 to 3 days prior to admission, persistent, associated with cough, persistent cough, dry, no associated pain. Upon presentation she was noted to be hypoxic in the 80s to 90s on room air. Chest x-ray on presentation showed evidence of pulmonary edema, as such her hypoxemia was attributed to fluid overload.   Other positive findings on

## 2022-09-10 NOTE — PLAN OF CARE
Problem: Skin/Tissue Integrity  Goal: Absence of new skin breakdown  Description: 1. Monitor for areas of redness and/or skin breakdown  2. Assess vascular access sites hourly  3. Every 4-6 hours minimum:  Change oxygen saturation probe site  4. Every 4-6 hours:  If on nasal continuous positive airway pressure, respiratory therapy assess nares and determine need for appliance change or resting period.   Outcome: Progressing     Problem: ABCDS Injury Assessment  Goal: Absence of physical injury  Outcome: Progressing  Flowsheets (Taken 9/9/2022 2000)  Absence of Physical Injury: Implement safety measures based on patient assessment     Problem: Respiratory - Adult  Goal: Achieves optimal ventilation and oxygenation  Outcome: Progressing  Flowsheets  Taken 9/10/2022 0416  Achieves optimal ventilation and oxygenation:   Assess for changes in mentation and behavior   Position to facilitate oxygenation and minimize respiratory effort   Oxygen supplementation based on oxygen saturation or arterial blood gases   Initiate smoking cessation protocol as indicated   Encourage broncho-pulmonary hygiene including cough, deep breathe, incentive spirometry  Taken 9/9/2022 2058  Achieves optimal ventilation and oxygenation:   Assess for changes in mentation and behavior   Position to facilitate oxygenation and minimize respiratory effort   Oxygen supplementation based on oxygen saturation or arterial blood gases   Assess the need for suctioning and aspirate as needed     Problem: Discharge Planning  Goal: Discharge to home or other facility with appropriate resources  Outcome: Progressing  Flowsheets (Taken 9/9/2022 2058)  Discharge to home or other facility with appropriate resources:   Arrange for needed discharge resources and transportation as appropriate   Identify discharge learning needs (meds, wound care, etc)   Arrange for interpreters to assist at discharge as needed   Refer to discharge planning if patient needs

## 2022-09-10 NOTE — PROGRESS NOTES
[facility]   Cardiology Progress Note     Admit Date: 2022     Reason for follow up: CHF    HPI and Interval History:   Patient seen and examined. Clinical notes reviewed. Telemetry reviewed. No new complaint today. Patient very hard of hearing and minimally interacting  Does not offer any complaints  No major events overnight. Review of System:  Limited as patient is not offering any complaints    Physical Examination:  Vitals:    09/10/22 0927   BP: (!) 166/91   Pulse: 85   Resp: 18   Temp:    SpO2: 93%        Intake/Output Summary (Last 24 hours) at 9/10/2022 1106  Last data filed at 9/10/2022 0416  Gross per 24 hour   Intake 0 ml   Output 650 ml   Net -650 ml     In: 0   Out: 650    Wt Readings from Last 3 Encounters:   09/10/22 110 lb 3.7 oz (50 kg)     Temp  Av.1 °F (36.7 °C)  Min: 97.4 °F (36.3 °C)  Max: 99 °F (37.2 °C)  Pulse  Av.9  Min: 74  Max: 88  BP  Min: 118/57  Max: 166/91  SpO2  Av %  Min: 93 %  Max: 100 %  FiO2   Av %  Min: 28 %  Max: 28 %    Telemetry: Sinus rhythm   Constitutional: Alert. No distress. Head: Normocephalic and atraumatic. Mouth/Throat: Lips appear moist. Oropharynx is clear and moist.  Eyes: Conjunctivae normal. EOM are normal.   Neck: Neck supple. No lymphadenopathy. No rigidity. No JVD present. Cardiovascular: Normal rate, regular rhythm. Normal S1&S2. Carotid pulse 2+ bilaterally. Pulmonary/Chest: Bilateral respiratory sounds present. No respiratory accessory muscle use. No wheezes, No rhonchi. Abdominal: Soft. Normal bowel sounds present. No distension, No tenderness. No splenomegaly. No hernia. Musculoskeletal: No tenderness. No edema    Lymphadenopathy: Has no cervical adenopathy. Neurological: Alert and oriented. Cranial nerve II-XII grossly intact, No gross deficit to touch. Skin: Skin is warm and dry. No rash, lesions, ulcerations noted. Psychiatric: No anxiety nor agitation.     Labs, diagnostic and imaging results reviewed. Reviewed. Recent Labs     09/09/22  0933 09/10/22  0523    144   K 4.0 3.7    104   CO2 22 18*   BUN 60* 59*   CREATININE 1.8* 1.6*     Recent Labs     09/09/22  0933 09/09/22  2031 09/10/22  0523   WBC 20.7*  --  20.9*   HGB 8.9*  --  8.8*   HCT 26.9* 32.6* 26.9*   MCV 96.9  --  95.6     --  320     Lab Results   Component Value Date/Time    TROPONINI 0.03 09/10/2022 05:23 AM     Estimated Creatinine Clearance: 16 mL/min (A) (based on SCr of 1.6 mg/dL (H)).    No results found for: BNP  No results found for: PROTIME, INR  No results found for: CHOL, HDL, TRIG    Scheduled Meds:   ALPRAZolam  0.25 mg Oral BID    docusate sodium  100 mg Oral BID    ferrous sulfate  325 mg Oral Daily with breakfast    furosemide  20 mg Oral Daily    levothyroxine  100 mcg Oral Daily    melatonin  5 mg Oral Nightly    traMADol  50 mg Oral TID    sodium chloride flush  5-40 mL IntraVENous 2 times per day    heparin (porcine)  5,000 Units SubCUTAneous 3 times per day     Continuous Infusions:   sodium chloride       PRN Meds:diclofenac sodium, diphenhydrAMINE, ipratropium-albuterol, sodium chloride, sodium chloride flush, sodium chloride, ondansetron **OR** ondansetron, polyethylene glycol, acetaminophen **OR** acetaminophen, guaiFENesin-dextromethorphan, benzonatate     Patient Active Problem List    Diagnosis Date Noted    Acute combined systolic and diastolic CHF, NYHA class 2 ([facility] 09/09/2022    Acute COVID-19 09/09/2022    Elevated BUN 09/09/2022    Elevated serum creatinine 09/09/2022    Normocytic normochromic anemia 09/09/2022    Leukocytosis 09/09/2022      Active Hospital Problems    Diagnosis Date Noted    Acute combined systolic and diastolic CHF, NYHA class 2 ([facility] [I50.41] 09/09/2022     Priority: Medium    Acute COVID-19 [U07.1] 09/09/2022     Priority: Medium    Elevated BUN [R79.9] 09/09/2022     Priority: Medium    Elevated serum creatinine [R79.89] 09/09/2022     Priority: Medium Normocytic normochromic anemia [D64.9] 09/09/2022     Priority: Medium    Leukocytosis [D72.829] 09/09/2022     Priority: Medium       Assessment and Plan:     COVID-19 infection, leukocytosis  HfpEF  Poorly controlled hypertension  Pulmonary hypertension, RVSP 45 mmHg    Recommendations:  Continue p.o. Lasix  Resume home medications to treat the blood pressure -nifedipine 30 mg daily  Treat her COVID-19 infection    Cardiology will sign off  Please call with any questions      Thank you for allowing me to participate in the care of this patient. If you have any questions, please do not hesitate to contact me. Lindy Roberts MD   Cardiac Electrophysiology  [address] [facility]  Office [phone]    For any EP related issues after 5 PM, contact [facility] on call cardiology through .

## 2022-09-10 NOTE — PROGRESS NOTES
Hospital Medicine History & Physical      PCP: Radha Lock MD    Date of Admission: 9/9/2022    Initial History Of Present Illness: Christin Collet is a 80 y.o. female with a past medical history of hypertension, hypothyroidism, and congestive heart failure, nursing home resident, presented to the [facility] emergency department by EMS from nursing home for evaluation of shortness of breath. Reportedly patient has been experiencing difficulty breathing and shortness of breath about 2 to 3 days prior to admission, persistent, associated with cough, persistent cough, dry, no associated pain. Upon presentation she was noted to be hypoxic in the 80s to 90s on room air. Chest x-ray on presentation showed evidence of pulmonary edema, as such her hypoxemia was attributed to fluid overload. Other positive findings on presentation, positive for COVID-19 infection, elevated creatinine and BUN, bilirubinemia and leukocytosis. Admitted for further evaluation and management. Patient is very hard of hearing, unable to provide history. Interval Hx:  Patient was seen and examined at bedside, alert, very hard of hearing. Persistent dry cough noted. Frail, ill looking    Past Medical History:          Diagnosis Date    Anxiety     Arthritis     CHF (congestive heart failure) (HCC)     GERD (gastroesophageal reflux disease)     Hyperlipidemia     Hypertension     Thyroid disease        Past Surgical History:      History reviewed. No pertinent surgical history. Medications Prior to Admission:      Prior to Admission medications    Medication Sig Start Date End Date Taking? Authorizing Provider   acetaminophen (TYLENOL) 325 MG tablet Take 650 mg by mouth in the morning and at bedtime   Yes Historical Provider, MD   ALPRAZolam (XANAX) 0.25 MG tablet Take 0.25 mg by mouth in the morning and at bedtime.    Yes Historical Provider, MD   Menthol-Zinc Oxide (CALMOSEPTINE EX) Apply topically in the morning and at bedtime For francisco-care   Yes Historical Provider, MD   diphenhydrAMINE (BENADRYL) 25 MG tablet Take 25 mg by mouth every 6 hours as needed for Itching   Yes Historical Provider, MD   docusate sodium (DOCUSIL) 100 MG capsule Take 100 mg by mouth 2 times daily   Yes Historical Provider, MD   ferrous sulfate (IRON 325) 325 (65 Fe) MG tablet Take 325 mg by mouth daily (with breakfast)   Yes Historical Provider, MD   furosemide (LASIX) 20 MG tablet Take 20 mg by mouth daily   Yes Historical Provider, MD   hydrocortisone 2.5 % cream Apply topically every 6 hours as needed   Yes Historical Provider, MD   ipratropium-albuterol (DUONEB) 0.5-2.5 (3) MG/3ML SOLN nebulizer solution Inhale 1 vial into the lungs every 4 hours as needed for Shortness of Breath   Yes Historical Provider, MD   levothyroxine (SYNTHROID) 100 MCG tablet Take 100 mcg by mouth Daily   Yes Historical Provider, MD   loperamide (IMODIUM) 2 MG capsule Take 2 mg by mouth every 4 hours as needed for Diarrhea   Yes Historical Provider, MD   melatonin 5 MG TABS tablet Take 5 mg by mouth nightly   Yes Historical Provider, MD   NIFEdipine (ADALAT CC) 30 MG extended release tablet Take 30 mg by mouth daily   Yes Historical Provider, MD   ondansetron (ZOFRAN) 4 MG tablet Take 4 mg by mouth every 8 hours as needed for Nausea or Vomiting   Yes Historical Provider, MD   Multiple Vitamin (MULTIVITAMIN ADULT PO) Take 1 tablet by mouth daily   Yes Historical Provider, MD   guaiFENesin (ROBITUSSIN CHEST CONGESTION PO) Take 5 mLs by mouth every 4 hours as needed   Yes Historical Provider, MD   sodium chloride (OCEAN) 0.65 % nasal spray 1 spray by Nasal route every 8 hours as needed for Congestion Each nostril   Yes Historical Provider, MD   traMADol (ULTRAM) 50 MG tablet Take 50 mg by mouth in the morning, at noon, and at bedtime.    Yes Historical Provider, MD   diclofenac sodium (VOLTAREN) 1 % GEL Apply 1 g topically 2 times daily as needed for Pain (right shoulder, elbow, 72 hours. Recent Labs     09/09/22 0933 09/09/22  2031 09/10/22  0523   TROPONINI 0.03* 0.03* 0.03*       Urinalysis:    No results found for: Isadore [place], BACTERIA, RBCUA, BLOODU, SPECGRAV, GLUCOSEU    Radiology:     XR CHEST PORTABLE   Final Result   1. Pulmonary edema. 2.  Small left pleural effusion. 3.  Age-indeterminate fractures of left ribs 8 and 9. In correlation with point tenderness. US ABDOMEN LIMITED    (Results Pending)   VL Extremity Venous Bilateral    (Results Pending)       Consults:    IP CONSULT TO HOSPITALIST  IP CONSULT TO CARDIOLOGY  IP CONSULT TO PHARMACY    ASSESSMENT:    49-year-old  lady with a past medical history as above, admitted with the following problems. Active Hospital Problems    Diagnosis Date Noted    Acute combined systolic and diastolic CHF, NYHA class 2 ([facility] [address] [I50.41] 09/09/2022     Priority: Medium    Acute COVID-19 [U07.1] 09/09/2022     Priority: Medium    Elevated BUN [R79.9] 09/09/2022     Priority: Medium    Elevated serum creatinine [R79.89] 09/09/2022     Priority: Medium    Normocytic normochromic anemia [D64.9] 09/09/2022     Priority: Medium    Leukocytosis [D72.829] 09/09/2022     Priority: Medium       PLAN:    #Acute hypoxic respiratory failure, likely due to acute exacerbation of chronic diastolic congestive heart failure, heart failure with preserved ejection fraction.  - Given 1 dose of IV Lasix in the emergency department. - Continue PO lasix    - appreciate cardiology input    #Elevated serum creatinine and BUN, with elevated BUN/creatinine ratio, no previous records to compare. Concern for possible chronic kidney disease, however unable to rule out TUTU. Trending down.  -pending renal ultrasound. #Acute COVID 19 infection.  -Onset of symptoms, 3 days prior to admission.  -Hypoxemia improved with diuresis.  Currently on room air.   - elevated inflammatory markers  -Given advanced age, markedly elevated inflammatory markers and comorbidities, will treat with 3 days course of remdesivir to decrease risk of progression.  -Symptomatic control with antitussives and antipyretics  -Isolation. #Normocytic normochromic anemia. Most likely due to anemia of chronic kidney disease. However given elevated BUN/creatinine ratio, will check reticulocyte count, iron studies, and fecal occult blood test.    #Leukocytosis, with neutrophil predominance. Likely reactive. Persistent  Will monitor. #Elevated troponin, mild, in the setting of volume overload, acute COVID infection and abnormal kidney function. - stable around 0.03      DVT Prophylaxis: Heparin subcu  Diet: ADULT DIET; Regular; Low Sodium (2 gm)  Code Status: Full Code    PT/OT Eval Status: To be determined    Dispo -inpatient    High risk for complication given advanced age, acute COVID infection, and abnormal kidney function.      Edyta Gonzales MD

## 2022-09-10 NOTE — PROGRESS NOTES
Patient's daughter Santino Mckenzie was updated on the patient's condition this morning. All questions answered.

## 2022-09-11 LAB
GLUCOSE BLD-MCNC: 125 MG/DL (ref 70–99)
PERFORMED ON: ABNORMAL
PROCALCITONIN: 0.56 NG/ML (ref 0–0.15)

## 2022-09-11 PROCEDURE — 6360000002 HC RX W HCPCS: Performed by: INTERNAL MEDICINE

## 2022-09-11 PROCEDURE — 36415 COLL VENOUS BLD VENIPUNCTURE: CPT

## 2022-09-11 PROCEDURE — 94761 N-INVAS EAR/PLS OXIMETRY MLT: CPT

## 2022-09-11 PROCEDURE — 2580000003 HC RX 258: Performed by: INTERNAL MEDICINE

## 2022-09-11 PROCEDURE — 2700000000 HC OXYGEN THERAPY PER DAY

## 2022-09-11 PROCEDURE — 6370000000 HC RX 637 (ALT 250 FOR IP): Performed by: INTERNAL MEDICINE

## 2022-09-11 PROCEDURE — 1200000000 HC SEMI PRIVATE

## 2022-09-11 PROCEDURE — 84145 PROCALCITONIN (PCT): CPT

## 2022-09-11 PROCEDURE — 3E0333Z INTRODUCTION OF ANTI-INFLAMMATORY INTO PERIPHERAL VEIN, PERCUTANEOUS APPROACH: ICD-10-PCS | Performed by: INTERNAL MEDICINE

## 2022-09-11 RX ORDER — FUROSEMIDE 10 MG/ML
40 INJECTION INTRAMUSCULAR; INTRAVENOUS 2 TIMES DAILY
Status: DISCONTINUED | OUTPATIENT
Start: 2022-09-11 | End: 2022-09-13

## 2022-09-11 RX ORDER — DEXAMETHASONE SODIUM PHOSPHATE 4 MG/ML
6 INJECTION, SOLUTION INTRA-ARTICULAR; INTRALESIONAL; INTRAMUSCULAR; INTRAVENOUS; SOFT TISSUE EVERY 24 HOURS
Status: DISCONTINUED | OUTPATIENT
Start: 2022-09-11 | End: 2022-09-15 | Stop reason: HOSPADM

## 2022-09-11 RX ADMIN — LEVOTHYROXINE SODIUM 100 MCG: 100 TABLET ORAL at 12:44

## 2022-09-11 RX ADMIN — ALPRAZOLAM 0.25 MG: 0.25 TABLET ORAL at 10:38

## 2022-09-11 RX ADMIN — NIFEDIPINE 30 MG: 30 TABLET, EXTENDED RELEASE ORAL at 12:43

## 2022-09-11 RX ADMIN — DOCUSATE SODIUM 100 MG: 100 CAPSULE, LIQUID FILLED ORAL at 12:43

## 2022-09-11 RX ADMIN — SODIUM CHLORIDE, PRESERVATIVE FREE 10 ML: 5 INJECTION INTRAVENOUS at 10:40

## 2022-09-11 RX ADMIN — REMDESIVIR 100 MG: 100 INJECTION, POWDER, LYOPHILIZED, FOR SOLUTION INTRAVENOUS at 18:19

## 2022-09-11 RX ADMIN — DEXAMETHASONE SODIUM PHOSPHATE 6 MG: 4 INJECTION, SOLUTION INTRAMUSCULAR; INTRAVENOUS at 10:38

## 2022-09-11 RX ADMIN — TRAMADOL HYDROCHLORIDE 50 MG: 50 TABLET, COATED ORAL at 18:20

## 2022-09-11 RX ADMIN — FERROUS SULFATE TAB 325 MG (65 MG ELEMENTAL FE) 325 MG: 325 (65 FE) TAB at 10:38

## 2022-09-11 RX ADMIN — FUROSEMIDE 20 MG: 20 TABLET ORAL at 12:44

## 2022-09-11 RX ADMIN — ALPRAZOLAM 0.25 MG: 0.25 TABLET ORAL at 22:10

## 2022-09-11 RX ADMIN — TRAMADOL HYDROCHLORIDE 50 MG: 50 TABLET, COATED ORAL at 10:38

## 2022-09-11 RX ADMIN — GUAIFENESIN SYRUP AND DEXTROMETHORPHAN 5 ML: 100; 10 SYRUP ORAL at 18:20

## 2022-09-11 RX ADMIN — HEPARIN SODIUM 5000 UNITS: 5000 INJECTION INTRAVENOUS; SUBCUTANEOUS at 22:10

## 2022-09-11 RX ADMIN — DOCUSATE SODIUM 100 MG: 100 CAPSULE, LIQUID FILLED ORAL at 22:10

## 2022-09-11 RX ADMIN — HEPARIN SODIUM 5000 UNITS: 5000 INJECTION INTRAVENOUS; SUBCUTANEOUS at 06:11

## 2022-09-11 RX ADMIN — BENZONATATE 100 MG: 100 CAPSULE ORAL at 10:38

## 2022-09-11 RX ADMIN — HEPARIN SODIUM 5000 UNITS: 5000 INJECTION INTRAVENOUS; SUBCUTANEOUS at 18:24

## 2022-09-11 RX ADMIN — SODIUM CHLORIDE, PRESERVATIVE FREE 10 ML: 5 INJECTION INTRAVENOUS at 22:10

## 2022-09-11 RX ADMIN — FUROSEMIDE 40 MG: 10 INJECTION, SOLUTION INTRAMUSCULAR; INTRAVENOUS at 18:20

## 2022-09-11 RX ADMIN — GUAIFENESIN SYRUP AND DEXTROMETHORPHAN 5 ML: 100; 10 SYRUP ORAL at 10:38

## 2022-09-11 RX ADMIN — TRAMADOL HYDROCHLORIDE 50 MG: 50 TABLET, COATED ORAL at 23:40

## 2022-09-11 RX ADMIN — Medication 5 MG: at 22:10

## 2022-09-11 NOTE — PLAN OF CARE
Problem: Skin/Tissue Integrity  Goal: Absence of new skin breakdown  Description: 1. Monitor for areas of redness and/or skin breakdown  2. Assess vascular access sites hourly  3. Every 4-6 hours minimum:  Change oxygen saturation probe site  4. Every 4-6 hours:  If on nasal continuous positive airway pressure, respiratory therapy assess nares and determine need for appliance change or resting period.   9/11/2022 1911 by Chery Simpson RN  Outcome: Progressing     Problem: ABCDS Injury Assessment  Goal: Absence of physical injury  9/11/2022 1911 by Chery Simpson RN  Outcome: Progressing     Problem: Respiratory - Adult  Goal: Achieves optimal ventilation and oxygenation  9/11/2022 1911 by Chery Simpson RN  Outcome: Progressing     Problem: Cardiovascular - Adult  Goal: Maintains optimal cardiac output and hemodynamic stability  9/11/2022 1911 by Chery Simpson RN  Outcome: Progressing     Problem: Cardiovascular - Adult  Goal: Absence of cardiac dysrhythmias or at baseline  9/11/2022 1911 by Chery Simpson RN  Outcome: Progressing

## 2022-09-11 NOTE — CONSULTS
Interval History and plan:      Blood pressure is elevated. Plan:    Continue COVID-19 treatment. Change Lasix to 40 mg IV twice daily  Discontinue nifedipine   Daily renal panel  UA  Renal US when stable                    Assessment :     Chronic kidney disease stage III B: Current GFR is 30 mm/min. I do not have any baseline number available. We will try to obtain records from the nursing home and Monday. Echocardiogram showed EF of 60 to 65%. Chest x-ray showed pulmonary edema. Shortness of breath: It is multifactorial including pulmonary edema and COVID-19    BNP was [facility] would like to thank Rigo Ramos MD   for opportunity to serve this patient      Please call with questions at-   24 Hrs Answering service [phone] or  7 am- 5 pm via Perfect serve or cell phone  Sam Sanford MD          CC/reason for consult :     Acute kidney injury     HPI :     Sekou Jeffers is a 80 y.o. female presented to   the hospital on 9/9/2022 with shortness of breath    She has past medical history of congestive heart failure, hypertension, hyperlipidemia, hypothyroidism and osteoarthritis. She was sent from nursing home because of shortness of breath. She is experiencing shortness of breath for 2 to 3 days prior to admission. It was associated with dry cough but no chest pain. There was no fever or chills. When she arrived in the ER she was hypoxic. Chest x-ray did show pulm edema on arrival.    She was tested for COVID-19 infection and came back positive. Also noted was elevated creatinine. Arrival creatinine was 1.8 with a BUN of 60. I do not have any baseline creatinine.     ROS:     Seen with-no family in the room    positives in bold   Hard of hearing                 All other remaining systems are negative or unable to obtain        PMH/PSH/SH/Family History:     Past Medical History:   Diagnosis Date    Anxiety     Arthritis     CHF (congestive heart failure) ([facility] [[email or web address] 75.)     GERD (gastroesophageal reflux disease)     Hyperlipidemia     Hypertension     Thyroid disease        History reviewed. No pertinent surgical history. reports that she does not currently use alcohol. She reports that she does not currently use drugs. family history is not on file.          Medication:     Current Facility-Administered Medications: dexamethasone (DECADRON) injection 6 mg, 6 mg, IntraVENous, Q24H  ALPRAZolam (XANAX) tablet 0.25 mg, 0.25 mg, Oral, BID  diclofenac sodium (VOLTAREN) 1 % gel 1 g, 1 g, Topical, BID PRN  diphenhydrAMINE (BENADRYL) tablet 25 mg, 25 mg, Oral, Q6H PRN  docusate sodium (COLACE) capsule 100 mg, 100 mg, Oral, BID  ferrous sulfate (IRON 325) tablet 325 mg, 325 mg, Oral, Daily with breakfast  furosemide (LASIX) tablet 20 mg, 20 mg, Oral, Daily  ipratropium-albuterol (DUONEB) nebulizer solution 3 mL, 1 vial, Inhalation, Q4H PRN  levothyroxine (SYNTHROID) tablet 100 mcg, 100 mcg, Oral, Daily  melatonin disintegrating tablet 5 mg, 5 mg, Oral, Nightly  sodium chloride (OCEAN, BABY AYR) 0.65 % nasal spray 1 spray, 1 spray, Nasal, Q8H PRN  traMADol (ULTRAM) tablet 50 mg, 50 mg, Oral, TID  NIFEdipine (ADALAT CC) extended release tablet 30 mg, 30 mg, Oral, Daily  [COMPLETED] remdesivir 200 mg in sodium chloride 0.9 % 250 mL IVPB, 200 mg, IntraVENous, Once **FOLLOWED BY** remdesivir 100 mg in sodium chloride 0.9 % 250 mL IVPB, 100 mg, IntraVENous, Q24H  0.9 % sodium chloride bolus, 30 mL, IntraVENous, PRN  sodium chloride flush 0.9 % injection 5-40 mL, 5-40 mL, IntraVENous, 2 times per day  sodium chloride flush 0.9 % injection 5-40 mL, 5-40 mL, IntraVENous, PRN  0.9 % sodium chloride infusion, , IntraVENous, PRN  ondansetron (ZOFRAN-ODT) disintegrating tablet 4 mg, 4 mg, Oral, Q8H PRN **OR** ondansetron (ZOFRAN) injection 4 mg, 4 mg, IntraVENous, Q6H PRN  polyethylene glycol (GLYCOLAX) packet 17 g, 17 g, Oral, Daily PRN  acetaminophen (TYLENOL) tablet 650 mg, 650 mg, Oral, Q6H PRN **OR** acetaminophen (TYLENOL) suppository 650 mg, 650 mg, Rectal, Q6H PRN  guaiFENesin-dextromethorphan (ROBITUSSIN DM) 100-10 MG/5ML syrup 5 mL, 5 mL, Oral, Q4H PRN  benzonatate (TESSALON) capsule 100 mg, 100 mg, Oral, TID PRN  heparin (porcine) injection 5,000 Units, 5,000 Units, SubCUTAneous, 3 times per day       Vitals :     Vitals:    09/11/22 1355   BP:    Pulse:    Resp:    Temp:    SpO2: 95%       I & O :       Intake/Output Summary (Last 24 hours) at 9/11/2022 1455  Last data filed at 9/11/2022 0442  Gross per 24 hour   Intake 530 ml   Output 400 ml   Net 130 ml        Physical Examination :     General appearance: A appearing, frail and cachectic. HEENT: Lips- normal, teeth- ok , oral mucosa- moist  Neck : Mass- no, appears symmetrical, JVD- not visible  Respiratory: Respiratory effort-  normal, wheeze- no, crackles -   Cardiovascular:  Ausculation- No M/R/G, Edema none  Abdomen: visible mass- no, distention- no, scar- no, tenderness- no                            hepatosplenomegaly-  no  Musculoskeletal: No clubbing or cyanosis  Skin: rashes- no , ulcers- no, induration- no, tightening - no  Psychiatric: Not evaluated  Additional finding:      LABS:     Recent Labs     09/09/22  0933 09/09/22  2031 09/10/22  0523   WBC 20.7*  --  20.9*   HGB 8.9*  --  8.8*   HCT 26.9* 32.6* 26.9*     --  320     Recent Labs     09/09/22  0933 09/10/22  0523    144   K 4.0 3.7    104   CO2 22 18*   BUN 60* 59*   CREATININE 1.8* 1.6*   GLUCOSE 124* 106*      Nephrology  [address] [facility] [address], [address]  Office: [phone]  Fax: [phone]

## 2022-09-11 NOTE — PLAN OF CARE
Problem: Skin/Tissue Integrity  Goal: Absence of new skin breakdown  Description: 1. Monitor for areas of redness and/or skin breakdown  2. Assess vascular access sites hourly  3. Every 4-6 hours minimum:  Change oxygen saturation probe site  4. Every 4-6 hours:  If on nasal continuous positive airway pressure, respiratory therapy assess nares and determine need for appliance change or resting period.   Outcome: Progressing     Problem: ABCDS Injury Assessment  Goal: Absence of physical injury  Outcome: Progressing  Flowsheets (Taken 9/10/2022 2100)  Absence of Physical Injury: Implement safety measures based on patient assessment     Problem: Respiratory - Adult  Goal: Achieves optimal ventilation and oxygenation  Outcome: Progressing     Problem: Discharge Planning  Goal: Discharge to home or other facility with appropriate resources  Outcome: Progressing  Flowsheets (Taken 9/10/2022 2057)  Discharge to home or other facility with appropriate resources:   Arrange for needed discharge resources and transportation as appropriate   Identify discharge learning needs (meds, wound care, etc)   Arrange for interpreters to assist at discharge as needed     Problem: Cardiovascular - Adult  Goal: Maintains optimal cardiac output and hemodynamic stability  Outcome: Progressing  Flowsheets (Taken 9/10/2022 2057)  Maintains optimal cardiac output and hemodynamic stability:   Monitor urine output and notify Licensed Independent Practitioner for values outside of normal range   Assess for signs of decreased cardiac output   Administer fluid and/or volume expanders as ordered  Goal: Absence of cardiac dysrhythmias or at baseline  Outcome: Progressing     Problem: Metabolic/Fluid and Electrolytes - Adult  Goal: Electrolytes maintained within normal limits  Outcome: Progressing  Flowsheets (Taken 9/10/2022 2057)  Electrolytes maintained within normal limits:   Monitor labs and assess patient for signs and symptoms of electrolyte imbalances   Monitor response to electrolyte replacements, including repeat lab results as appropriate   Administer electrolyte replacement as ordered  Goal: Hemodynamic stability and optimal renal function maintained  Outcome: Progressing  Flowsheets (Taken 9/10/2022 2057)  Hemodynamic stability and optimal renal function maintained:   Monitor intake, output and patient weight   Encourage oral intake as appropriate   Monitor response to interventions for patient's volume status, including labs, urine output, blood pressure (other measures as available)   Monitor urine specific gravity, serum osmolarity and serum sodium as indicated or ordered     Problem: SLP Adult - Impaired Swallowing  Goal: By Discharge: Advance to least restrictive diet without signs or symptoms of aspiration for planned discharge setting. See evaluation for individualized goals.   9/10/2022 1701 by RICA Mariee  Outcome: Progressing

## 2022-09-11 NOTE — PROGRESS NOTES
Hospitalist Progress Note      PCP: Grace Carrington MD    Date of Admission: 9/9/2022    Chief Complaint: Shortness of breath    Hospital Course: Presented from nursing home for shortness of breath. Known to have chronic CHF. Evaluated by cardiology. No findings to support acute CHF. Also tested positive for COVID-19 in addition to the finding of elevated creatinine on arrival.  Noted requires oxygen. Subjective: Shortness of breath. No chest pain, no nausea, no vomiting, no abdominal pain. Confused. Medications:  Reviewed    Infusion Medications    sodium chloride       Scheduled Medications    dexamethasone  6 mg IntraVENous Q24H    ALPRAZolam  0.25 mg Oral BID    docusate sodium  100 mg Oral BID    ferrous sulfate  325 mg Oral Daily with breakfast    furosemide  20 mg Oral Daily    levothyroxine  100 mcg Oral Daily    melatonin  5 mg Oral Nightly    traMADol  50 mg Oral TID    NIFEdipine  30 mg Oral Daily    remdesivir IVPB  100 mg IntraVENous Q24H    sodium chloride flush  5-40 mL IntraVENous 2 times per day    heparin (porcine)  5,000 Units SubCUTAneous 3 times per day     PRN Meds: diclofenac sodium, diphenhydrAMINE, ipratropium-albuterol, sodium chloride, sodium chloride, sodium chloride flush, sodium chloride, ondansetron **OR** ondansetron, polyethylene glycol, acetaminophen **OR** acetaminophen, guaiFENesin-dextromethorphan, benzonatate      Intake/Output Summary (Last 24 hours) at 9/11/2022 1147  Last data filed at 9/11/2022 0442  Gross per 24 hour   Intake 530 ml   Output 400 ml   Net 130 ml       Physical Exam Performed:    BP (!) 153/75   Pulse 76   Temp 98.1 °F (36.7 °C) (Axillary)   Resp 20   Ht 5' 6\" (1.676 m)   Wt 118 lb 2.7 oz (53.6 kg)   SpO2 96%   BMI 19.07 kg/m²     General appearance: No apparent distress, appears stated age, ill-appearing. Frail. HEENT: Pupils equal, round, and reactive to light. Conjunctivae/corneas clear.   Neck: Supple, with full range of motion. No jugular venous distention. Trachea midline. Respiratory: Decreased air movement, few scattered rhonchi. Cardiovascular: Regular rate and rhythm with normal S1/S2 without murmurs, rubs or gallops. Abdomen: Soft, non-tender, non-distended with normal bowel sounds. Musculoskeletal: No clubbing, cyanosis or edema bilaterally. Full range of motion without deformity. Skin: Skin color, texture, turgor normal.  No rashes or lesions. Neurologic: Difficult exam.  Probably nonfocal given no grossly detectable focal findings. Could not test cranial nerves due to lack of cooperation. Psychiatric: Alert and confused  Capillary Refill: Brisk, 3 seconds, normal   Peripheral Pulses: +2 palpable, equal bilaterally       Labs:   Recent Labs     09/09/22  0933 09/09/22  2031 09/10/22  0523   WBC 20.7*  --  20.9*   HGB 8.9*  --  8.8*   HCT 26.9* 32.6* 26.9*     --  320     Recent Labs     09/09/22  0933 09/10/22  0523    144   K 4.0 3.7    104   CO2 22 18*   BUN 60* 59*   CREATININE 1.8* 1.6*   CALCIUM 9.0 9.3     Recent Labs     09/09/22  0933 09/10/22  0523   AST 28 25   ALT 21 19   BILITOT 0.5 0.5   ALKPHOS 228* 222*     No results for input(s): INR in the last 72 hours. Recent Labs     09/09/22  0933 09/09/22  2031 09/10/22  0523   TROPONINI 0.03* 0.03* 0.03*       Urinalysis:    No results found for: Paul Lasso, BACTERIA, RBCUA, BLOODU, SPECGRAV, GLUCOSEU    Radiology:  XR CHEST PORTABLE   Final Result   1. Pulmonary edema. 2.  Small left pleural effusion. 3.  Age-indeterminate fractures of left ribs 8 and 9. In correlation with point tenderness.       US ABDOMEN LIMITED    (Results Pending)   VL Extremity Venous Bilateral    (Results Pending)           Assessment/Plan:    Active Hospital Problems    Diagnosis     Acute COVID-19 [U07.1]      Priority: Medium    Elevated BUN [R79.9]      Priority: Medium    Elevated serum creatinine [R79.89]      Priority: Medium    Normocytic normochromic anemia [D64.9]      Priority: Medium    Leukocytosis [D72.829]      Priority: Medium     PLAN:    Acute hypoxic respiratory failure  Secondary to COVID-19. Per cardiology, patient does not have acute CHF. We will continue oxygen supplementation. Acute COVID-19 infection  Tested positive 3 days ago. Continue remdesivir. Also starting dexamethasone. Suspected chronic kidney disease  Creatinine was 1.8 yesterday, 1.6 today. Given patient's age, gives the impression of advanced CKD with a possibly acute component. Continue to monitor. Given lack of baseline, I will ask nephrology to evaluate. Anemia  Gives the impression of being chronic, with no acute blood loss. Monitor and transfuse if indicated. Hypothyroidism  Continue same dose of Synthroid as before    Elevated troponin  Consistent with demand ischemia. Not a candidate for ischemic evaluation even if patient turns out to have true ACS. DVT Prophylaxis: Continue subcutaneous heparin  Diet: ADULT DIET; Regular; Low Sodium (2 gm)  Code Status: Full Code  PT/OT Eval Status: Cannot tolerate today. Dispo -inpatient stay, unclear duration. Probably 2 or 3 more days.       Rodri Ng MD

## 2022-09-11 NOTE — CONSULTS
Clinical Pharmacy Consult Note  Medication History     Admit Date: 9/9/2022    Pharmacy consulted to verify home medication list by Dr. Sy Oshea. List of of current medications patient is taking is complete. Home Medication list in EPIC updated to reflect changes noted below.     Source of information: [facility]/Records (patient's long-term care facility)    Changes made to medication list:   Medications removed: (include reason, ex: therapy completed, patient no longer taking, etc.)  None at this time  Medications added:   None at this time  Medication doses adjusted:   Tramadol 50 mg q8h changed to tramadol 50 mg q8h prn pain; ordered currently as tramadol 50 mg q8h (scheduled) on MAR  Other notes:  Records from [facility] given to patient's RN on 09/11/22 to be placed in patient's chart     Current Outpatient Medications   Medication Instructions    acetaminophen (TYLENOL) 650 mg, Oral, 2 times daily    ALPRAZolam (XANAX) 0.25 mg, Oral, 2 times daily    diclofenac sodium (VOLTAREN) 1 g, Topical, 2 TIMES DAILY PRN    diphenhydrAMINE (BENADRYL) 25 mg, Oral, EVERY 6 HOURS PRN    docusate sodium (COLACE) 100 mg, Oral, 2 TIMES DAILY    ferrous sulfate (IRON 325) 325 mg, Oral, DAILY WITH BREAKFAST    furosemide (LASIX) 20 mg, Oral, DAILY    guaiFENesin (ROBITUSSIN CHEST CONGESTION PO) 5 mLs, Oral, EVERY 4 HOURS PRN    hydrocortisone 2.5 % cream Topical, EVERY 6 HOURS PRN    ipratropium-albuterol (DUONEB) 0.5-2.5 (3) MG/3ML SOLN nebulizer solution 1 vial, Inhalation, EVERY 4 HOURS PRN    levothyroxine (SYNTHROID) 100 mcg, Oral, DAILY    loperamide (IMODIUM) 2 mg, Oral, EVERY 4 HOURS PRN, Max of 16 mg/day    melatonin 5 mg, Oral, NIGHTLY    Menthol-Zinc Oxide (CALMOSEPTINE EX) Apply externally, 2 times daily, For francisco-care    Multiple Vitamin (MULTIVITAMIN ADULT PO) 1 tablet, Oral, DAILY    NIFEdipine (ADALAT CC) 30 mg, Oral, DAILY    ondansetron (ZOFRAN) 4 mg, Oral, EVERY 8 HOURS PRN sodium chloride (OCEAN) 0.65 % nasal spray 1 spray, Nasal, EVERY 8 HOURS PRN, Each nostril    traMADol (ULTRAM) 50 mg, Oral, EVERY 8 HOURS PRN     Please call with any questions.     Pili Evans, PharmD  Clinical Pharmacist - Emergency Dept  Wireless: [phone]  9/11/2022 2:00 PM

## 2022-09-12 ENCOUNTER — APPOINTMENT (OUTPATIENT)
Dept: VASCULAR LAB | Age: 87
DRG: 177 | End: 2022-09-12
Payer: COMMERCIAL

## 2022-09-12 ENCOUNTER — APPOINTMENT (OUTPATIENT)
Dept: ULTRASOUND IMAGING | Age: 87
DRG: 177 | End: 2022-09-12
Payer: COMMERCIAL

## 2022-09-12 LAB
A/G RATIO: 0.9 (ref 1.1–2.2)
ALBUMIN SERPL-MCNC: 3.2 G/DL (ref 3.4–5)
ALP BLD-CCNC: 221 U/L (ref 40–129)
ALT SERPL-CCNC: 27 U/L (ref 10–40)
ANION GAP SERPL CALCULATED.3IONS-SCNC: 17 MMOL/L (ref 3–16)
AST SERPL-CCNC: 27 U/L (ref 15–37)
BASOPHILS ABSOLUTE: 0 K/UL (ref 0–0.2)
BASOPHILS RELATIVE PERCENT: 0.1 %
BILIRUB SERPL-MCNC: 0.3 MG/DL (ref 0–1)
BUN BLDV-MCNC: 68 MG/DL (ref 7–20)
CALCIUM SERPL-MCNC: 8.4 MG/DL (ref 8.3–10.6)
CHLORIDE BLD-SCNC: 108 MMOL/L (ref 99–110)
CO2: 23 MMOL/L (ref 21–32)
CREAT SERPL-MCNC: 1.6 MG/DL (ref 0.6–1.2)
EOSINOPHILS ABSOLUTE: 0 K/UL (ref 0–0.6)
EOSINOPHILS RELATIVE PERCENT: 0 %
GFR AFRICAN AMERICAN: 36
GFR NON-AFRICAN AMERICAN: 30
GLUCOSE BLD-MCNC: 141 MG/DL (ref 70–99)
HCT VFR BLD CALC: 25.3 % (ref 36–48)
HEMOGLOBIN: 8.5 G/DL (ref 12–16)
LYMPHOCYTES ABSOLUTE: 1.2 K/UL (ref 1–5.1)
LYMPHOCYTES RELATIVE PERCENT: 8.7 %
MCH RBC QN AUTO: 31.9 PG (ref 26–34)
MCHC RBC AUTO-ENTMCNC: 33.6 G/DL (ref 31–36)
MCV RBC AUTO: 95 FL (ref 80–100)
MONOCYTES ABSOLUTE: 1 K/UL (ref 0–1.3)
MONOCYTES RELATIVE PERCENT: 7.2 %
NEUTROPHILS ABSOLUTE: 11.8 K/UL (ref 1.7–7.7)
NEUTROPHILS RELATIVE PERCENT: 84 %
PDW BLD-RTO: 14.2 % (ref 12.4–15.4)
PHOSPHORUS: 4.7 MG/DL (ref 2.5–4.9)
PLATELET # BLD: 325 K/UL (ref 135–450)
PLATELET SLIDE REVIEW: ADEQUATE
PMV BLD AUTO: 8.8 FL (ref 5–10.5)
POTASSIUM SERPL-SCNC: 3.5 MMOL/L (ref 3.5–5.1)
RBC # BLD: 2.66 M/UL (ref 4–5.2)
SLIDE REVIEW: ABNORMAL
SODIUM BLD-SCNC: 148 MMOL/L (ref 136–145)
TOTAL PROTEIN: 6.6 G/DL (ref 6.4–8.2)
WBC # BLD: 14.1 K/UL (ref 4–11)

## 2022-09-12 PROCEDURE — 76770 US EXAM ABDO BACK WALL COMP: CPT

## 2022-09-12 PROCEDURE — 80053 COMPREHEN METABOLIC PANEL: CPT

## 2022-09-12 PROCEDURE — 36415 COLL VENOUS BLD VENIPUNCTURE: CPT

## 2022-09-12 PROCEDURE — 93970 EXTREMITY STUDY: CPT

## 2022-09-12 PROCEDURE — 6370000000 HC RX 637 (ALT 250 FOR IP): Performed by: INTERNAL MEDICINE

## 2022-09-12 PROCEDURE — 85025 COMPLETE CBC W/AUTO DIFF WBC: CPT

## 2022-09-12 PROCEDURE — 6360000002 HC RX W HCPCS: Performed by: INTERNAL MEDICINE

## 2022-09-12 PROCEDURE — 1200000000 HC SEMI PRIVATE

## 2022-09-12 PROCEDURE — 84100 ASSAY OF PHOSPHORUS: CPT

## 2022-09-12 PROCEDURE — 92526 ORAL FUNCTION THERAPY: CPT

## 2022-09-12 PROCEDURE — 2580000003 HC RX 258: Performed by: INTERNAL MEDICINE

## 2022-09-12 RX ADMIN — DEXAMETHASONE SODIUM PHOSPHATE 6 MG: 4 INJECTION, SOLUTION INTRAMUSCULAR; INTRAVENOUS at 09:22

## 2022-09-12 RX ADMIN — FERROUS SULFATE TAB 325 MG (65 MG ELEMENTAL FE) 325 MG: 325 (65 FE) TAB at 09:23

## 2022-09-12 RX ADMIN — TRAMADOL HYDROCHLORIDE 50 MG: 50 TABLET, COATED ORAL at 09:23

## 2022-09-12 RX ADMIN — Medication 5 MG: at 20:55

## 2022-09-12 RX ADMIN — BENZONATATE 100 MG: 100 CAPSULE ORAL at 09:23

## 2022-09-12 RX ADMIN — ALPRAZOLAM 0.25 MG: 0.25 TABLET ORAL at 09:23

## 2022-09-12 RX ADMIN — TRAMADOL HYDROCHLORIDE 50 MG: 50 TABLET, COATED ORAL at 20:55

## 2022-09-12 RX ADMIN — REMDESIVIR 100 MG: 100 INJECTION, POWDER, LYOPHILIZED, FOR SOLUTION INTRAVENOUS at 15:45

## 2022-09-12 RX ADMIN — TRAMADOL HYDROCHLORIDE 50 MG: 50 TABLET, COATED ORAL at 15:39

## 2022-09-12 RX ADMIN — HEPARIN SODIUM 5000 UNITS: 5000 INJECTION INTRAVENOUS; SUBCUTANEOUS at 20:55

## 2022-09-12 RX ADMIN — SODIUM CHLORIDE, PRESERVATIVE FREE 10 ML: 5 INJECTION INTRAVENOUS at 09:24

## 2022-09-12 RX ADMIN — DOCUSATE SODIUM 100 MG: 100 CAPSULE, LIQUID FILLED ORAL at 09:23

## 2022-09-12 RX ADMIN — HEPARIN SODIUM 5000 UNITS: 5000 INJECTION INTRAVENOUS; SUBCUTANEOUS at 05:50

## 2022-09-12 RX ADMIN — SODIUM CHLORIDE, PRESERVATIVE FREE 10 ML: 5 INJECTION INTRAVENOUS at 20:55

## 2022-09-12 RX ADMIN — HEPARIN SODIUM 5000 UNITS: 5000 INJECTION INTRAVENOUS; SUBCUTANEOUS at 15:39

## 2022-09-12 RX ADMIN — DOCUSATE SODIUM 100 MG: 100 CAPSULE, LIQUID FILLED ORAL at 20:55

## 2022-09-12 RX ADMIN — ALPRAZOLAM 0.25 MG: 0.25 TABLET ORAL at 20:55

## 2022-09-12 RX ADMIN — LEVOTHYROXINE SODIUM 100 MCG: 100 TABLET ORAL at 09:23

## 2022-09-12 ASSESSMENT — PAIN SCALES - PAIN ASSESSMENT IN ADVANCED DEMENTIA (PAINAD)
TOTALSCORE: 0
CONSOLABILITY: 0
BREATHING: 0
NEGVOCALIZATION: 0
TOTALSCORE: 0
NEGVOCALIZATION: 0
FACIALEXPRESSION: 0
FACIALEXPRESSION: 0
TOTALSCORE: 2
CONSOLABILITY: 1
BODYLANGUAGE: 0
CONSOLABILITY: 0
NEGVOCALIZATION: 0
BREATHING: 0
BREATHING: 0
FACIALEXPRESSION: 0
BODYLANGUAGE: 0
BODYLANGUAGE: 1

## 2022-09-12 NOTE — PROGRESS NOTES
Hospitalist Progress Note      PCP: Natividad Martinez MD    Date of Admission: 9/9/2022    Chief Complaint: Shortness of breath    Hospital Course: Presented from nursing home for shortness of breath. Known to have chronic CHF. Evaluated by cardiology. No findings to support acute CHF. Also tested positive for COVID-19 in addition to the finding of elevated creatinine on arrival.  Noted requires oxygen. Subjective: Pt remains confused. Very frail. Would like something to drink. No specific complaints though her confusion limits a complete ROS. Medications:  Reviewed    Infusion Medications    sodium chloride       Scheduled Medications    dexamethasone  6 mg IntraVENous Q24H    [Held by provider] furosemide  40 mg IntraVENous BID    ALPRAZolam  0.25 mg Oral BID    docusate sodium  100 mg Oral BID    ferrous sulfate  325 mg Oral Daily with breakfast    levothyroxine  100 mcg Oral Daily    melatonin  5 mg Oral Nightly    traMADol  50 mg Oral TID    remdesivir IVPB  100 mg IntraVENous Q24H    sodium chloride flush  5-40 mL IntraVENous 2 times per day    heparin (porcine)  5,000 Units SubCUTAneous 3 times per day     PRN Meds: diclofenac sodium, diphenhydrAMINE, ipratropium-albuterol, sodium chloride, sodium chloride, sodium chloride flush, sodium chloride, ondansetron **OR** ondansetron, polyethylene glycol, acetaminophen **OR** acetaminophen, guaiFENesin-dextromethorphan, benzonatate      Intake/Output Summary (Last 24 hours) at 9/12/2022 1237  Last data filed at 9/12/2022 0428  Gross per 24 hour   Intake --   Output 450 ml   Net -450 ml       Physical Exam Performed:    BP (!) 149/83   Pulse 63   Temp 97.4 °F (36.3 °C) (Axillary)   Resp 16   Ht 5' 6\" (1.676 m)   Wt 119 lb 0.8 oz (54 kg)   SpO2 96%   BMI 19.21 kg/m²     General appearance: No apparent distress, appears stated age, ill-appearing. Frail. HEENT: Pupils equal, round, and reactive to light. Conjunctivae/corneas clear.   Neck: Supple, with full range of motion. No jugular venous distention. Trachea midline. Respiratory: Decreased air movement, few scattered rhonchi. Cardiovascular: Regular rate and rhythm with normal S1/S2 without murmurs, rubs or gallops. Abdomen: Soft, non-tender, non-distended with normal bowel sounds. Musculoskeletal: No clubbing, cyanosis or edema bilaterally. Full range of motion without deformity. Skin: Skin color, texture, turgor normal.  No rashes or lesions. Neurologic: Difficult exam.  Probably nonfocal given no grossly detectable focal findings. Could not test cranial nerves due to lack of cooperation. Psychiatric: Alert and confused  Capillary Refill: Brisk, 3 seconds, normal   Peripheral Pulses: +2 palpable, equal bilaterally       Labs:   Recent Labs     09/09/22  2031 09/10/22  0523 09/12/22  0436   WBC  --  20.9* 14.1*   HGB  --  8.8* 8.5*   HCT 32.6* 26.9* 25.3*   PLT  --  320 325     Recent Labs     09/10/22  0523 09/12/22  0436    148*   K 3.7 3.5    108   CO2 18* 23   BUN 59* 68*   CREATININE 1.6* 1.6*   CALCIUM 9.3 8.4   PHOS  --  4.7     Recent Labs     09/10/22  0523 09/12/22  0436   AST 25 27   ALT 19 27   BILITOT 0.5 0.3   ALKPHOS 222* 221*     No results for input(s): INR in the last 72 hours. Recent Labs     09/09/22  2031 09/10/22  0523   TROPONINI 0.03* 0.03*       Urinalysis:    No results found for: Delon Drone, BACTERIA, RBCUA, BLOODU, SPECGRAV, GLUCOSEU    Radiology:  XR CHEST PORTABLE   Final Result   1. Pulmonary edema. 2.  Small left pleural effusion. 3.  Age-indeterminate fractures of left ribs 8 and 9. In correlation with point tenderness.       US ABDOMEN LIMITED    (Results Pending)   VL Extremity Venous Bilateral    (Results Pending)           Assessment/Plan:    Active Hospital Problems    Diagnosis     Acute COVID-19 [U07.1]      Priority: Medium    Elevated BUN [R79.9]      Priority: Medium    Elevated serum creatinine [R79.89]      Priority: Medium Normocytic normochromic anemia [D64.9]      Priority: Medium    Leukocytosis [D72.829]      Priority: Medium     PLAN:    Acute hypoxic respiratory failure  Secondary to COVID-19. Per cardiology, patient does not have acute CHF. We will continue oxygen supplementation as needed  WBC downtrending  D-dimer was high on admit, suspected 2/2 Covid. Will f/u on LE doppler for DVTs (cannot perform CTA or VQ)    Acute COVID-19 infection  Tested positive 4 days ago. Continue remdesivir, examethasone. Suspected chronic kidney disease  Creatinine was 1.8 on admit, 1.6 today. Given patient's age, gives the impression of advanced CKD with a possibly acute component. Continue to monitor. Given lack of baseline, I will ask nephrology to evaluate. Anemia  Gives the impression of being chronic, with no acute blood loss. Monitor and transfuse if indicated. Hypothyroidism  Continue same dose of Synthroid as before    Elevated troponin  Consistent with demand ischemia. Not a candidate for ischemic evaluation even if patient turns out to have true ACS. DVT Prophylaxis: Continue subcutaneous heparin  Diet: ADULT DIET; Dysphagia - Soft and Bite Sized  Code Status: Discussed with daughter today who confirmed pt has living will that states she is a DNR and agrees with what her mother wants. Will change CODE STATUS to DNR/DNI  PT/OT Eval Status: Cannot tolerate today. Dispo -inpatient stay, unclear duration. Probably 2 or 3 more days.       Dawson Chavez MD

## 2022-09-12 NOTE — PROGRESS NOTES
Speech Language Pathology  Facility/Department: [facility] 4 PCU  Dysphagia Daily Treatment Note    NAME: Lopez Gotti  : 1924  MRN: [id number]    Patient Diagnosis(es):   Patient Active Problem List    Diagnosis Date Noted    Acute combined systolic and diastolic CHF, NYHA class 2 ([facility] [address] 2022    Acute COVID-19 2022    Elevated BUN 2022    Elevated serum creatinine 2022    Normocytic normochromic anemia 2022    Leukocytosis 2022       Recent Chest Xray: (2022)  Impression   1. Pulmonary edema. 2.  Small left pleural effusion. 3.  Age-indeterminate fractures of left ribs 8 and 9. In correlation with point tenderness           Chart reviewed. Medical Diagnosis: Acute combined systolic and diastolic CHF, NYHA class 2 ([facility]) [I50.41]  Acute on chronic congestive heart failure, unspecified heart failure type ([facility] [address] [I50.9]  COVID [U07.1]   Treatment Diagnosis:dysphagia     BSE Impression 9/10/22-  Dysphagia Impression : Needs further assessment. Oral motor exam limited by inconsistent direction following (pt very [place], confused). Limited dentition. Oral cavity appears moist, clean. With patient seated up in bed on room air, trialed ice chips, thins via tsp/cup/straw, puree. Pt with positive oral acceptance, positive swallow movement, good oral clearance. Inconsistent dry cough present (both with and without PO). Reduced with small, controlled straw sips. Recommend frequent oral hygiene (x3 daily), and puree diet, thin liquids via straw (small sips only), monitoring tolerance. Recommend instrumental assessment as appropriate (unable at this time d/t weekend scheduling/staffing limitations). Will continue to follow. Dysphagia Diagnosis: Suspected needs further assessment    MBS results - will con't to monitor for need     Pain: did not state     Current Diet : ADULT DIET; Regular;  Low Sodium (2 gm) -- recommend downgrading diet to dysphagia III/soft and bite sized Recommended Form of Meds: Crushed in puree as able  Compensatory Swallowing Strategies : Upright as possible for all oral intake, Eat/Feed slowly, Alternate solids and liquids, Small bites/sips, Assist feed, Remain upright for 30-45 minutes after meals     Treatment:  Pt seen bedside to address the following goals:    1: Patient will participate in further assessment of swallow function as appropriate. 9/12- RN reported no concerns re: swallowing or lung status. Pt currently on regular diet, although pureed recommended on 9/10. RN reported no concerns with regular diet. Per hard chart, pt on regular diet at [facility]. On this date, pt with persistent coughing upon entering pt's room. With first few trials with water by straw and applesauce, pt noted to cough prior to and after the swallow. Once coughing subsided, pt was able to consume 3oz water continuously with no s/s aspiration. Pt with limited dentition. Pt with moderate difficulty with mastication with cracker, stating \"I can't chew that, it's too hard\". Pt with mild-moderately prolonged mastication with sausage and no difficulty with oatmeal or applesauce. Given that pt is COVID +, persistent coughing likely due to COVID and not aspiration. Will con't to assess for need for instrumental assessment. Recommend downgrading diet to dysphagia III/soft and bite sized. Con't goal         Patient/Family/Caregiver Education:  9/12- attempted to educated pt to purpose of the visit but pt extremely [place] so did not appear to comprehend     Compensatory Strategies:  Compensatory Swallowing Strategies : Upright as possible for all oral intake, Eat/Feed slowly, Alternate solids and liquids, Small bites/sips, Assist feed, Remain upright for 30-45 minutes after meals      Plan:  Continue dysphagia treatment with goals per plan of care.   Diet recommendations:downgrade to Dysphagia III/soft and bite sized (from regular) with thin liquids-Make NPO if s/s aspiration emerge and alert SLP  Will con't to monitor for need for MBS  DC recommendation:TBD closer to discharge   Treatment: 20  D/W nursing, [facility]   Needs met prior to leaving room, call button in reach.     Citlaly Torres [address]  Speech-Language Pathologist  Pager [phone]       If patient is discharged prior to next treatment, this note will serve as the discharge summary

## 2022-09-12 NOTE — PROGRESS NOTES
09/12/22 1407   Encounter Summary   Encounter Overview/Reason  Advance Care Planning   Service Provided For: Family   Last Encounter    (es 9/12)   Complexity of Encounter High   Begin Time 1340   End Time  1408   Total Time Calculated 28 min   Plan and Referrals   Plan/Referrals No future visits requested

## 2022-09-12 NOTE — PROGRESS NOTES
Phone update given to daughter at this time.     Electronically signed by Manas Purcell RN on 9/12/2022 at 6:06 PM

## 2022-09-12 NOTE — PLAN OF CARE
Problem: Skin/Tissue Integrity  Goal: Absence of new skin breakdown  Description: 1. Monitor for areas of redness and/or skin breakdown  2. Assess vascular access sites hourly  3. Every 4-6 hours minimum:  Change oxygen saturation probe site  4. Every 4-6 hours:  If on nasal continuous positive airway pressure, respiratory therapy assess nares and determine need for appliance change or resting period.   9/12/2022 0453 by Aleah Arnold RN  Outcome: Progressing  9/11/2022 1911 by Lavinia Bloom RN  Outcome: Progressing

## 2022-09-12 NOTE — ACP (ADVANCE CARE PLANNING)
Advance Care Planning     Advance Care Planning Inpatient Note  Spiritual Care Department    Today's Date: 9/12/2022  Unit: [facility] 4 PCU    Received request from admission screening. Upon review of chart and communication with care team, . Patient does not have capacity at this time I spoke by phone with Healthcare Decision Maker and Child/Children-  (daughter Franky)  Goals of ACP Conversation:  Discuss advance care planning documents    Health Care Decision Makers:       Primary Decision MakerFranmaikol Lopez - [phone]    Secondary Decision Maker: Wagner Trevizo - [phone]  Summary:  Verified [facility] [facility] (Patient Wishes):  Healthcare Power of /Advance Directive Appointment of [address]  Living Will/Advance Directive     Assessment:  N/A     Interventions:  Facilitated scanning of documents    Care Preferences Communicated:   No    Outcomes/Plan:  ACP Discussion: Completed  Copy of advance directive given to staff to scan into medical record.     Electronically signed by Daija Garcia, [address] on 9/12/2022 at 2:03 PM

## 2022-09-12 NOTE — PLAN OF CARE
Problem: Skin/Tissue Integrity  Goal: Absence of new skin breakdown  Description: 1. Monitor for areas of redness and/or skin breakdown  2. Assess vascular access sites hourly  3. Every 4-6 hours minimum:  Change oxygen saturation probe site  4. Every 4-6 hours:  If on nasal continuous positive airway pressure, respiratory therapy assess nares and determine need for appliance change or resting period.   9/12/2022 1726 by Sally Harris RN  Outcome: Progressing  Note: Turned Q2     Problem: ABCDS Injury Assessment  Goal: Absence of physical injury  9/12/2022 0453 by Kristen Bermudez RN  Outcome: Progressing  Flowsheets (Taken 9/11/2022 2200)  Absence of Physical Injury: Implement safety measures based on patient assessment     Problem: Respiratory - Adult  Goal: Achieves optimal ventilation and oxygenation  9/12/2022 1726 by Sally Harris RN  Outcome: Progressing  Flowsheets (Taken 9/12/2022 1726)  Achieves optimal ventilation and oxygenation:   Assess for changes in respiratory status   Position to facilitate oxygenation and minimize respiratory effort   Assess for changes in mentation and behavior   Oxygen supplementation based on oxygen saturation or arterial blood gases     Problem: Discharge Planning  Goal: Discharge to home or other facility with appropriate resources  Outcome: Progressing  Flowsheets (Taken 9/12/2022 0913)  Discharge to home or other facility with appropriate resources: Identify barriers to discharge with patient and caregiver     Problem: Metabolic/Fluid and Electrolytes - Adult  Goal: Electrolytes maintained within normal limits  Outcome: Progressing  Flowsheets (Taken 9/12/2022 0913)  Electrolytes maintained within normal limits: Monitor labs and assess patient for signs and symptoms of electrolyte imbalances     Problem: Metabolic/Fluid and Electrolytes - Adult  Goal: Hemodynamic stability and optimal renal function maintained  Outcome: Progressing  Flowsheets (Taken 9/12/2022

## 2022-09-13 ENCOUNTER — APPOINTMENT (OUTPATIENT)
Dept: GENERAL RADIOLOGY | Age: 87
DRG: 177 | End: 2022-09-13
Payer: COMMERCIAL

## 2022-09-13 LAB
ALBUMIN SERPL-MCNC: 3.1 G/DL (ref 3.4–5)
ANION GAP SERPL CALCULATED.3IONS-SCNC: 17 MMOL/L (ref 3–16)
BUN BLDV-MCNC: 69 MG/DL (ref 7–20)
CALCIUM SERPL-MCNC: 7.9 MG/DL (ref 8.3–10.6)
CHLORIDE BLD-SCNC: 104 MMOL/L (ref 99–110)
CO2: 22 MMOL/L (ref 21–32)
CREAT SERPL-MCNC: 1.5 MG/DL (ref 0.6–1.2)
GFR AFRICAN AMERICAN: 39
GFR NON-AFRICAN AMERICAN: 32
GLUCOSE BLD-MCNC: 111 MG/DL (ref 70–99)
PHOSPHORUS: 3.5 MG/DL (ref 2.5–4.9)
POTASSIUM SERPL-SCNC: 3.5 MMOL/L (ref 3.5–5.1)
PROCALCITONIN: 0.47 NG/ML (ref 0–0.15)
SODIUM BLD-SCNC: 143 MMOL/L (ref 136–145)

## 2022-09-13 PROCEDURE — 2700000000 HC OXYGEN THERAPY PER DAY

## 2022-09-13 PROCEDURE — 6370000000 HC RX 637 (ALT 250 FOR IP): Performed by: INTERNAL MEDICINE

## 2022-09-13 PROCEDURE — 92611 MOTION FLUOROSCOPY/SWALLOW: CPT

## 2022-09-13 PROCEDURE — 31720 CLEARANCE OF AIRWAYS: CPT

## 2022-09-13 PROCEDURE — 6360000002 HC RX W HCPCS: Performed by: INTERNAL MEDICINE

## 2022-09-13 PROCEDURE — 2580000003 HC RX 258: Performed by: INTERNAL MEDICINE

## 2022-09-13 PROCEDURE — 1200000000 HC SEMI PRIVATE

## 2022-09-13 PROCEDURE — 92526 ORAL FUNCTION THERAPY: CPT

## 2022-09-13 PROCEDURE — 84145 PROCALCITONIN (PCT): CPT

## 2022-09-13 PROCEDURE — 94640 AIRWAY INHALATION TREATMENT: CPT

## 2022-09-13 PROCEDURE — 6360000002 HC RX W HCPCS: Performed by: STUDENT IN AN ORGANIZED HEALTH CARE EDUCATION/TRAINING PROGRAM

## 2022-09-13 PROCEDURE — 71045 X-RAY EXAM CHEST 1 VIEW: CPT

## 2022-09-13 PROCEDURE — 36415 COLL VENOUS BLD VENIPUNCTURE: CPT

## 2022-09-13 PROCEDURE — 74230 X-RAY XM SWLNG FUNCJ C+: CPT

## 2022-09-13 PROCEDURE — 2580000003 HC RX 258: Performed by: STUDENT IN AN ORGANIZED HEALTH CARE EDUCATION/TRAINING PROGRAM

## 2022-09-13 PROCEDURE — 94761 N-INVAS EAR/PLS OXIMETRY MLT: CPT

## 2022-09-13 PROCEDURE — 80069 RENAL FUNCTION PANEL: CPT

## 2022-09-13 RX ORDER — AMLODIPINE BESYLATE 5 MG/1
5 TABLET ORAL DAILY
Status: DISCONTINUED | OUTPATIENT
Start: 2022-09-13 | End: 2022-09-14

## 2022-09-13 RX ORDER — HYDRALAZINE HYDROCHLORIDE 20 MG/ML
10 INJECTION INTRAMUSCULAR; INTRAVENOUS EVERY 6 HOURS PRN
Status: DISCONTINUED | OUTPATIENT
Start: 2022-09-13 | End: 2022-09-15 | Stop reason: HOSPADM

## 2022-09-13 RX ADMIN — DOCUSATE SODIUM 100 MG: 100 CAPSULE, LIQUID FILLED ORAL at 11:41

## 2022-09-13 RX ADMIN — HEPARIN SODIUM 5000 UNITS: 5000 INJECTION INTRAVENOUS; SUBCUTANEOUS at 13:58

## 2022-09-13 RX ADMIN — AMPICILLIN SODIUM AND SULBACTAM SODIUM 3000 MG: 2; 1 INJECTION, POWDER, FOR SOLUTION INTRAMUSCULAR; INTRAVENOUS at 13:55

## 2022-09-13 RX ADMIN — SODIUM CHLORIDE, PRESERVATIVE FREE 10 ML: 5 INJECTION INTRAVENOUS at 20:15

## 2022-09-13 RX ADMIN — HEPARIN SODIUM 5000 UNITS: 5000 INJECTION INTRAVENOUS; SUBCUTANEOUS at 20:14

## 2022-09-13 RX ADMIN — TRAMADOL HYDROCHLORIDE 50 MG: 50 TABLET, COATED ORAL at 20:14

## 2022-09-13 RX ADMIN — AMPICILLIN SODIUM AND SULBACTAM SODIUM 3000 MG: 2; 1 INJECTION, POWDER, FOR SOLUTION INTRAMUSCULAR; INTRAVENOUS at 23:37

## 2022-09-13 RX ADMIN — ALPRAZOLAM 0.25 MG: 0.25 TABLET ORAL at 20:13

## 2022-09-13 RX ADMIN — HEPARIN SODIUM 5000 UNITS: 5000 INJECTION INTRAVENOUS; SUBCUTANEOUS at 05:58

## 2022-09-13 RX ADMIN — AMLODIPINE BESYLATE 5 MG: 5 TABLET ORAL at 11:41

## 2022-09-13 RX ADMIN — SODIUM CHLORIDE, PRESERVATIVE FREE 10 ML: 5 INJECTION INTRAVENOUS at 11:51

## 2022-09-13 RX ADMIN — DOCUSATE SODIUM 100 MG: 100 CAPSULE, LIQUID FILLED ORAL at 20:14

## 2022-09-13 RX ADMIN — DEXAMETHASONE SODIUM PHOSPHATE 6 MG: 4 INJECTION, SOLUTION INTRAMUSCULAR; INTRAVENOUS at 11:40

## 2022-09-13 RX ADMIN — BENZONATATE 100 MG: 100 CAPSULE ORAL at 11:41

## 2022-09-13 RX ADMIN — TRAMADOL HYDROCHLORIDE 50 MG: 50 TABLET, COATED ORAL at 11:41

## 2022-09-13 RX ADMIN — FERROUS SULFATE TAB 325 MG (65 MG ELEMENTAL FE) 325 MG: 325 (65 FE) TAB at 11:41

## 2022-09-13 RX ADMIN — GUAIFENESIN SYRUP AND DEXTROMETHORPHAN 5 ML: 100; 10 SYRUP ORAL at 04:29

## 2022-09-13 RX ADMIN — ALPRAZOLAM 0.25 MG: 0.25 TABLET ORAL at 11:41

## 2022-09-13 RX ADMIN — Medication 5 MG: at 20:14

## 2022-09-13 RX ADMIN — IPRATROPIUM BROMIDE AND ALBUTEROL SULFATE 3 ML: .5; 3 SOLUTION RESPIRATORY (INHALATION) at 04:55

## 2022-09-13 RX ADMIN — LEVOTHYROXINE SODIUM 100 MCG: 100 TABLET ORAL at 11:41

## 2022-09-13 ASSESSMENT — PAIN DESCRIPTION - DESCRIPTORS
DESCRIPTORS: ACHING
DESCRIPTORS: ACHING

## 2022-09-13 ASSESSMENT — PAIN SCALES - PAIN ASSESSMENT IN ADVANCED DEMENTIA (PAINAD)
FACIALEXPRESSION: 0
FACIALEXPRESSION: 1
CONSOLABILITY: 1
BREATHING: 0
TOTALSCORE: 2
CONSOLABILITY: 0
NEGVOCALIZATION: 1
BODYLANGUAGE: 1
FACIALEXPRESSION: 1
FACIALEXPRESSION: 0
BODYLANGUAGE: 0
BREATHING: 0
BODYLANGUAGE: 0
NEGVOCALIZATION: 0
BODYLANGUAGE: 0
TOTALSCORE: 3
NEGVOCALIZATION: 0
CONSOLABILITY: 1
CONSOLABILITY: 0
NEGVOCALIZATION: 1
TOTALSCORE: 4
BREATHING: 0
TOTALSCORE: 0
NEGVOCALIZATION: 1
BODYLANGUAGE: 0
FACIALEXPRESSION: 1
CONSOLABILITY: 1
TOTALSCORE: 0
FACIALEXPRESSION: 0
BREATHING: 0
BREATHING: 0
TOTALSCORE: 3
BREATHING: 0
BODYLANGUAGE: 0
CONSOLABILITY: 1
NEGVOCALIZATION: 1

## 2022-09-13 ASSESSMENT — PAIN DESCRIPTION - ORIENTATION
ORIENTATION: MID
ORIENTATION: MID

## 2022-09-13 ASSESSMENT — PAIN DESCRIPTION - PAIN TYPE: TYPE: CHRONIC PAIN

## 2022-09-13 ASSESSMENT — PAIN DESCRIPTION - ONSET: ONSET: ON-GOING

## 2022-09-13 ASSESSMENT — PAIN - FUNCTIONAL ASSESSMENT: PAIN_FUNCTIONAL_ASSESSMENT: ACTIVITIES ARE NOT PREVENTED

## 2022-09-13 ASSESSMENT — PAIN SCALES - GENERAL: PAINLEVEL_OUTOF10: 3

## 2022-09-13 ASSESSMENT — PAIN DESCRIPTION - FREQUENCY: FREQUENCY: CONTINUOUS

## 2022-09-13 NOTE — PROGRESS NOTES
Hospitalist Progress Note      PCP: Kay Gottlieb MD    Date of Admission: 9/9/2022    Chief Complaint: Shortness of breath    Hospital Course: Presented from nursing home for shortness of breath. Known to have chronic CHF. Evaluated by cardiology. No findings to support acute CHF. Also tested positive for COVID-19 in addition to the finding of elevated creatinine on arrival.  Noted requires oxygen. Subjective: Overnight events noted. Remains confused, asking for water. Will not actively participate in ROS. Medications:  Reviewed    Infusion Medications    sodium chloride       Scheduled Medications    amLODIPine  5 mg Oral Daily    ampicillin-sulbactam  3,000 mg IntraVENous Q12H    dexamethasone  6 mg IntraVENous Q24H    ALPRAZolam  0.25 mg Oral BID    docusate sodium  100 mg Oral BID    ferrous sulfate  325 mg Oral Daily with breakfast    levothyroxine  100 mcg Oral Daily    melatonin  5 mg Oral Nightly    traMADol  50 mg Oral TID    sodium chloride flush  5-40 mL IntraVENous 2 times per day    heparin (porcine)  5,000 Units SubCUTAneous 3 times per day     PRN Meds: hydrALAZINE, diclofenac sodium, diphenhydrAMINE, ipratropium-albuterol, sodium chloride, sodium chloride, sodium chloride flush, sodium chloride, ondansetron **OR** ondansetron, polyethylene glycol, acetaminophen **OR** acetaminophen, guaiFENesin-dextromethorphan, benzonatate      Intake/Output Summary (Last 24 hours) at 9/13/2022 1351  Last data filed at 9/12/2022 2310  Gross per 24 hour   Intake --   Output 100 ml   Net -100 ml       Physical Exam Performed:    BP (!) 175/80   Pulse 74   Temp 99 °F (37.2 °C) (Axillary)   Resp 18   Ht 5' 6\" (1.676 m)   Wt 114 lb 10.2 oz (52 kg)   SpO2 93%   BMI 18.50 kg/m²     General appearance: No apparent distress, appears stated age, ill-appearing. Frail. HEENT: Pupils equal, round, and reactive to light. Conjunctivae/corneas clear. Neck: Supple, with full range of motion.  No jugular venous distention. Trachea midline. Respiratory: Decreased air movement, few scattered rhonchi. Cardiovascular: Regular rate and rhythm with normal S1/S2 without murmurs, rubs or gallops. Abdomen: Soft, non-tender, non-distended with normal bowel sounds. Musculoskeletal: No clubbing, cyanosis or edema bilaterally. Full range of motion without deformity. Skin: Skin color, texture, turgor normal.  No rashes or lesions. Neurologic: Difficult exam.  Probably nonfocal given no grossly detectable focal findings. Could not test cranial nerves due to lack of cooperation. Psychiatric: Alert and confused  Capillary Refill: Brisk, 3 seconds, normal   Peripheral Pulses: +2 palpable, equal bilaterally       Labs:   Recent Labs     09/12/22 0436   WBC 14.1*   HGB 8.5*   HCT 25.3*        Recent Labs     09/12/22 0436 09/13/22  0710   * 143   K 3.5 3.5    104   CO2 23 22   BUN 68* 69*   CREATININE 1.6* 1.5*   CALCIUM 8.4 7.9*   PHOS 4.7 3.5     Recent Labs     09/12/22 0436   AST 27   ALT 27   BILITOT 0.3   ALKPHOS 221*     No results for input(s): INR in the last 72 hours. No results for input(s): [place] Jennifer in the last 72 hours. Urinalysis:    No results found for: Kirti Learn, BACTERIA, RBCUA, BLOODU, [address], [address]    Radiology:  XR CHEST PORTABLE   Final Result   Impression: Multifocal pulmonary consolidative opacities, left greater than right, slightly increased in the interval.      FL MODIFIED BARIUM SWALLOW W VIDEO   Final Result   1. Deep penetration with thin liquids and with nectar consistencies. No aspiration visualized. 2. Oral dysphagia. VL Extremity Venous Bilateral         US RENAL COMPLETE   Final Result   1. Small atrophic appearing kidneys with no hydronephrosis. XR CHEST PORTABLE   Final Result   1. Pulmonary edema. 2.  Small left pleural effusion. 3.  Age-indeterminate fractures of left ribs 8 and 9.  In correlation with point tenderness. Assessment/Plan:    Active Hospital Problems    Diagnosis     Acute COVID-19 [U07.1]      Priority: Medium    Elevated BUN [R79.9]      Priority: Medium    Elevated serum creatinine [R79.89]      Priority: Medium    Normocytic normochromic anemia [D64.9]      Priority: Medium    Leukocytosis [D72.829]      Priority: Medium     PLAN:    Acute hypoxic respiratory failure  Secondary to COVID-19. Per cardiology, patient does not have acute CHF. We will continue oxygen supplementation as needed  O2 need increased overnight as concern for aspiration event. SLP say pt today, mild risk of aspiration. CXR obtained this AM with new L basilar opacity. For now given worsened O2, will add Unasyn for aspiration PNA  D-dimer was high on admit, suspected 2/2 Covid. Will f/u on LE doppler for DVTs (cannot perform CTA or VQ)    Acute COVID-19 infection  Tested positive 5 days ago. Continue remdesivir, examethasone. Suspected chronic kidney disease  Creatinine was 1.8 on admit, 1.6 today. Given patient's age, gives the impression of advanced CKD with a possibly acute component. Continue to monitor. Given lack of baseline, I will ask nephrology to evaluate. Anemia  Gives the impression of being chronic, with no acute blood loss. Monitor and transfuse if indicated. Hypothyroidism  Continue same dose of Synthroid as before    Elevated troponin  Consistent with demand ischemia. Not a candidate for ischemic evaluation even if patient turns out to have true ACS. DVT Prophylaxis: Continue subcutaneous heparin  Diet: Diet NPO  Code Status: Discussed with daughter today who confirmed pt has living will that states she is a DNR and agrees with what her mother wants. Will change CODE STATUS to DNR/DNI  PT/OT Eval Status: Cannot tolerate today. Dispo -inpatient stay, unclear duration. Probably 2 or 3 more days.       Be Saunders MD

## 2022-09-13 NOTE — PROGRESS NOTES
Patient's daughter Cordell Martinez was updated this morning. Daughter was informed about patient's possibility of aspiration and NPO status. Daughter was also notified about low 02 sats and suctioning event overnight.

## 2022-09-13 NOTE — CARE COORDINATION
Case Management Assessment           Initial Evaluation                Date / Time of Evaluation: 9/13/2022 11:23 AM                 Assessment Completed by: Duglas Melvin RN    Patient Name: Melvin Gutierrez     YOB: 1924  Diagnosis: Acute combined systolic and diastolic CHF, NYHA class 2 ([facility] [address] [I50.41]  Acute on chronic congestive heart failure, unspecified heart failure type ([address] [I50.9]  COVID [U07.1]     Date / Time: 9/9/2022  8:52 AM    Patient Admission Status: Inpatient    If patient is discharged prior to next notation, then this note serves as note for discharge by case management. Current PCP: Dot Bianchi MD  Clinic Patient: No    Chart Reviewed: Yes  Patient/ Family Interviewed: Yes    Initial assessment completed at bedside with: daughter over the phone     Hospitalization in the last 30 days: No    Emergency Contacts:  Extended Emergency Contact Information  Primary Emergency Contact: Mili Armenta  Address: [place] [address] [address] [place] [address] Phone: [phone]  Mobile Phone: [phone]  Relation: Child  Preferred language: English   needed? No  Secondary Emergency Contact: [facility] Phone: [phone]  Mobile Phone: [phone]  Relation: Son-in-Law  Preferred language: English   needed? No    Advance Directives:   Code Status: Limited    Healthcare Power of : Yes  Agent: Ismael Marin- daughter  Contact Number: [phone]    Copy present: No     In paper Chart: No    Scanned into EMR No    Financial  Payor: Henry Garvin / Plan: Jonn Araujo / Product Type: *No Product type* /     Pre-cert required for SNF: Yes    Pharmacy    [address] - [address] [phone]  [address] [place] [address].   [place] [id number]  Phone: [phone] Fax: [phone]      Potential assistance Purchasing Medications: Potential goals of Acute combined systolic and diastolic CHF, NYHA class 2 (HCC) [I50.41]  Acute on chronic congestive heart failure, unspecified heart failure type ([facility] [address] [I50.9]  COVID [U07.1]    The Patient and/or patient representative Cruz Costa and her family were provided with a choice of provider and agrees with the discharge plan Yes    Freedom of choice list was provided with basic dialogue that supports the patient's individualized plan of care/goals and shares the quality data associated with the providers.  Yes    Care Transition patient: No    Amara Recinos RN  [facility]  Case Management Department  Ph: [phone]   Fax: [phone]

## 2022-09-13 NOTE — PROGRESS NOTES
Speech Language Pathology      Chart reviewed. Spoke with RN who reported pt had a possible aspiration episode last night. Will perform Modified Barium Swallow this date to further assess swallowing function. Full report to follow.     Mckinley Emilio, [place], [address]  Speech-Language Pathologist  Pager [phone]

## 2022-09-13 NOTE — PROGRESS NOTES
Interval History and plan:      Blood pressure is elevated  Urine is ? 250 ml    Plan:    Creatinine is stable at 1.5  She was hypernatremic 148> 143 now  Continue COVID-19 treatment. Hold lasix   Start amlodipine  Daily renal panel  UA  Renal US when stable                    Assessment :     Chronic kidney disease stage III B: Current GFR is 30 mm/min. I do not have any baseline number available. We will try to obtain records from the nursing home and Monday. Echocardiogram showed EF of 60 to 65%. Chest x-ray showed pulmonary edema. Shortness of breath: It is multifactorial including pulmonary edema and COVID-19    BNP was [address] Nephrology would like to thank Be Saunders MD   for opportunity to serve this patient      Please call with questions at-   24 Hrs Answering service [phone] or  7 am- 5 pm via Perfect serve or cell phone  Marlene Perez MD          CC/reason for consult :     Acute kidney injury     HPI :     Xavier Roque is a 80 y.o. female presented to   the hospital on 9/9/2022 with shortness of breath    She has past medical history of congestive heart failure, hypertension, hyperlipidemia, hypothyroidism and osteoarthritis. She was sent from nursing home because of shortness of breath. She is experiencing shortness of breath for 2 to 3 days prior to admission. It was associated with dry cough but no chest pain. There was no fever or chills. When she arrived in the ER she was hypoxic. Chest x-ray did show pulm edema on arrival.    She was tested for COVID-19 infection and came back positive. Also noted was elevated creatinine. Arrival creatinine was 1.8 with a BUN of 60. I do not have any baseline creatinine.     ROS:     Seen with-no family in the room    positives in bold   Hard of hearing                 All other remaining systems are negative or unable to obtain        PMH/PSH/SH/Family History:     Past Medical History:   Diagnosis Date Anxiety     Arthritis     CHF (congestive heart failure) (HCC)     GERD (gastroesophageal reflux disease)     Hyperlipidemia     Hypertension     Thyroid disease        History reviewed. No pertinent surgical history. reports that she does not currently use alcohol. She reports that she does not currently use drugs. family history is not on file.          Medication:     Current Facility-Administered Medications: dexamethasone (DECADRON) injection 6 mg, 6 mg, IntraVENous, Q24H  [Held by provider] furosemide (LASIX) injection 40 mg, 40 mg, IntraVENous, BID  ALPRAZolam (XANAX) tablet 0.25 mg, 0.25 mg, Oral, BID  diclofenac sodium (VOLTAREN) 1 % gel 1 g, 1 g, Topical, BID PRN  diphenhydrAMINE (BENADRYL) tablet 25 mg, 25 mg, Oral, Q6H PRN  docusate sodium (COLACE) capsule 100 mg, 100 mg, Oral, BID  ferrous sulfate (IRON 325) tablet 325 mg, 325 mg, Oral, Daily with breakfast  ipratropium-albuterol (DUONEB) nebulizer solution 3 mL, 1 vial, Inhalation, Q4H PRN  levothyroxine (SYNTHROID) tablet 100 mcg, 100 mcg, Oral, Daily  melatonin disintegrating tablet 5 mg, 5 mg, Oral, Nightly  sodium chloride (OCEAN, BABY AYR) 0.65 % nasal spray 1 spray, 1 spray, Nasal, Q8H PRN  traMADol (ULTRAM) tablet 50 mg, 50 mg, Oral, TID  0.9 % sodium chloride bolus, 30 mL, IntraVENous, PRN  sodium chloride flush 0.9 % injection 5-40 mL, 5-40 mL, IntraVENous, 2 times per day  sodium chloride flush 0.9 % injection 5-40 mL, 5-40 mL, IntraVENous, PRN  0.9 % sodium chloride infusion, , IntraVENous, PRN  ondansetron (ZOFRAN-ODT) disintegrating tablet 4 mg, 4 mg, Oral, Q8H PRN **OR** ondansetron (ZOFRAN) injection 4 mg, 4 mg, IntraVENous, Q6H PRN  polyethylene glycol (GLYCOLAX) packet 17 g, 17 g, Oral, Daily PRN  acetaminophen (TYLENOL) tablet 650 mg, 650 mg, Oral, Q6H PRN **OR** acetaminophen (TYLENOL) suppository 650 mg, 650 mg, Rectal, Q6H PRN  guaiFENesin-dextromethorphan (ROBITUSSIN DM) 100-10 MG/5ML syrup 5 mL, 5 mL, Oral, Q4H PRN  benzonatate (TESSALON) capsule 100 mg, 100 mg, Oral, TID PRN  heparin (porcine) injection 5,000 Units, 5,000 Units, SubCUTAneous, 3 times per day       Vitals :     Vitals:    09/13/22 0851   BP: (!) 165/79   Pulse: 72   Resp: 20   Temp: 98.6 °F (37 °C)   SpO2:        I & O :       Intake/Output Summary (Last 24 hours) at 9/13/2022 0936  Last data filed at 9/12/2022 2310  Gross per 24 hour   Intake 240 ml   Output 250 ml   Net -10 ml          Physical Examination :     General appearance: A appearing, frail and cachectic. HEENT: Lips- normal, teeth- ok , oral mucosa- moist  Neck : Mass- no, appears symmetrical, JVD- not visible  Respiratory: Respiratory effort-  normal, wheeze- no, crackles -   Cardiovascular:  Ausculation- No M/R/G, Edema none  Abdomen: visible mass- no, distention- no, scar- no, tenderness- no                            hepatosplenomegaly-  no  Musculoskeletal: No clubbing or cyanosis  Skin: rashes- no , ulcers- no, induration- no, tightening - no  Psychiatric: Not evaluated  Additional finding:      LABS:     Recent Labs     09/12/22  0436   WBC 14.1*   HGB 8.5*   HCT 25.3*          Recent Labs     09/12/22  0436 09/13/22  0710   * 143   K 3.5 3.5    104   CO2 23 22   BUN 68* 69*   CREATININE 1.6* 1.5*   GLUCOSE 141* 111*   PHOS 4.7 3.5        Nephrology  [address] [facility], [address] [facility]  Office: [phone]  Fax: [phone]

## 2022-09-13 NOTE — PROCEDURES
INSTRUMENTAL SWALLOW REPORT  MODIFIED BARIUM SWALLOW  Treatment     NAME: Maurizio Hyde   : 1924  MRN: [id number]       Date of Eval: 2022     Ordering Physician: Whit Arboleda  Radiologist: Paris Avila     Referring Diagnosis(es): Referring Diagnosis: CHF- concern for aspiration    Past Medical History:  has a past medical history of Anxiety, Arthritis, CHF (congestive heart failure) ([address] GERD (gastroesophageal reflux disease), Hyperlipidemia, Hypertension, and Thyroid disease. Past Surgical History:  has no past surgical history on file. Recent Chest Xray: (2022)  Impression   1. Pulmonary edema. 2.  Small left pleural effusion. 3.  Age-indeterminate fractures of left ribs 8 and 9. In correlation with point tenderness             Type of Study: Initial MBS     Patient Complaints/Reason for Referral:  Maurizio Hyde was referred for a MBS to assess the efficiency of his/her swallow function, assess for aspiration, and to make recommendations regarding safe dietary consistencies, effective compensatory strategies, and safe eating environment. Onset of problem:   Date of Onset: 22      Behavior/Cognition/Vision/Hearing:  Behavior/Cognition: Alert;Pleasant mood;Confused; Cooperative  Vision: Impaired  Hearing: Exceptions to [facility]  Hearing Exceptions: Hard of hearing/hearing concerns    Impressions:  Pt presents with moderate oral dysphagia with mild pharyngeal dysphagia  Oral- pt with prolonged mastication with solids. No difficulty with puree and no difficulty drawing thin and nectar thick liquids up straw. Pharyngeal- pt with impaired hyolaryngeal mechanics resulting in incomplete epiglottal closure. Pt noted to spontaneously take very large amounts by straw- did not attempt cup as pt unable to hold independently. With thin by straw, pt with deep penetration with eventual aspiration when allowed to consume successive swallows.  Pt noted to spontaneously cough to clear aspirated material from the airway. When was given smaller sip of thin by straw, pt was stopped after taking second swallow which resulted in deep penetration to the cords. Did not attempt chin tuck, head turns or bolus hold strategies as pt is very [place] and cognitively impaired so would not be able to  perform independently. Shallow penetration with spontaneous clearing noted with nectar thick liquid by straw. No aspiration or penetration noted with pureed or cracker. Pt had no residue remaining in valleculae or pyriform after the swallow with any consistency. Treatment Dx and ICD 10: 13.12   Patient Position: Lateral and        Consistencies Administered: Soft & Bite Sized;Pureed; Thin straw;Mildly Thick cup    Dysphagia Outcome Severity Scale: Level 4: Mild moderate dysphagia- Intermittent supervision/cueing. One - two diet consistencies restricted  Penetration-Aspiration Scale (PAS): 6 - Material enters the airway, passes below the vocal folds, and is ejected into the larynx or out of the airway    Recommended Diet:  Solid consistency: Minced and Moist  Liquid consistency: Mildly Thick  Liquid administration via: Straw- by single sips  Spoke with daughter by phone- if pt expresses dislike for nectar thick liquids, can upgrade to thin for quality of life   Medication administration: Meds in puree    Safe Swallow Protocol:  Supervision: 1:1  Compensatory Swallowing Strategies : Eat/Feed slowly;Upright as possible for all oral intake;Remain upright for 30-45 minutes after meals; External pacing        Recommendations/Treatment  Requires SLP Intervention: Yes        D/C Recommendations: Ongoing speech therapy is recommended during this hospitalization     Recommended Exercises:    Therapeutic Interventions: Diet tolerance monitoring;Oral care; Patient/Family education         Education: Images and recommendations were reviewed with pt following this exam.   Patient Education: Pt educated to results of MBS- pt wtih questionable comprehension. Daughter educated via phone re: results of aspiration and diet recommendations- daughter demonstrated comprehension  Patient Education Response: Needs reinforcement    Safety Devices  Safety Devices in place: Yes (staff present for safety)      Goals:    1: Patient will participate in further assessment of swallow function as appropriate. 9/12- RN reported no concerns re: swallowing or lung status. Pt currently on regular diet, although pureed recommended on 9/10. RN reported no concerns with regular diet. Per hard chart, pt on regular diet at [facility]. On this date, pt with persistent coughing upon entering pt's room. With first few trials with water by straw and applesauce, pt noted to cough prior to and after the swallow. Once coughing subsided, pt was able to consume 3oz water continuously with no s/s aspiration. Pt with limited dentition. Pt with moderate difficulty with mastication with cracker, stating \"I can't chew that, it's too hard\". Pt with mild-moderately prolonged mastication with sausage and no difficulty with oatmeal or applesauce. Given that pt is COVID +, persistent coughing likely due to COVID and not aspiration. Will con't to assess for need for instrumental assessment. Recommend downgrading diet to dysphagia III/soft and bite sized. Con't goal   9/13- goal met- competed MBS    New goals-  1-The patient will tolerate least restrictive diet without s/s aspiration or respiratory decline. 2-The pt/caregiver will demonstrate understanding of swallowing recommendations and concerns. 9/13-   The pt was educated to the result of MBS. Pt indicated comprehension but would benefit from reinforcement. Educated daughter, Mariposa Friend, via phone to results of MBS, concern for aspiration,  diet recommendations (thin vs nectar thick liquids) swallowing strategies (single sips if upgrade to thin) and quality of life considerations given that pt is 80years old.   Daughter stated comprehension and decided to try minced and moist diet with nectar thick liquids, but if pt objects to nectar thick liquids then will upgrade to thin liquids by single sips for quality of life given that pt is 80years old. con't goal      Pain- no indication of pain        Plan:  Continue goals per POC 3-5x   Recommended diet:Dysphagia II/minced and moist with nectar/mildly thick liquids by straw by single sips  Educated daughter by phone- daughter decided if pt expresses dislike for nectar thick liquids, can upgrade to thin liquids for quality of life  Total treatment time:30  Pt's discharge plan:to home   Discharge Plan: To be determined closer to discharge  Discussed with RNFanny   Staff present to meet pt needs and to monitor for safety.       Citlaly Patel, [facility]- SLP  [id number]  Pg # [phone]  This document will serve as a discharge summary if pt discharge before next treatment   session      Therapy Time:   Individual Concurrent Group Co-treatment   Time In 0940         Time Out 1010         Minutes 30

## 2022-09-14 LAB
ALBUMIN SERPL-MCNC: 2.9 G/DL (ref 3.4–5)
ANION GAP SERPL CALCULATED.3IONS-SCNC: 14 MMOL/L (ref 3–16)
BASOPHILS ABSOLUTE: 0 K/UL (ref 0–0.2)
BASOPHILS RELATIVE PERCENT: 0 %
BUN BLDV-MCNC: 63 MG/DL (ref 7–20)
CALCIUM SERPL-MCNC: 8.3 MG/DL (ref 8.3–10.6)
CHLORIDE BLD-SCNC: 110 MMOL/L (ref 99–110)
CO2: 26 MMOL/L (ref 21–32)
CREAT SERPL-MCNC: 1.5 MG/DL (ref 0.6–1.2)
EOSINOPHILS ABSOLUTE: 0 K/UL (ref 0–0.6)
EOSINOPHILS RELATIVE PERCENT: 0 %
GFR AFRICAN AMERICAN: 39
GFR NON-AFRICAN AMERICAN: 32
GLUCOSE BLD-MCNC: 102 MG/DL (ref 70–99)
HCT VFR BLD CALC: 25.7 % (ref 36–48)
HEMOGLOBIN: 8.4 G/DL (ref 12–16)
LYMPHOCYTES ABSOLUTE: 1.5 K/UL (ref 1–5.1)
LYMPHOCYTES RELATIVE PERCENT: 10 %
MCH RBC QN AUTO: 31.7 PG (ref 26–34)
MCHC RBC AUTO-ENTMCNC: 32.6 G/DL (ref 31–36)
MCV RBC AUTO: 97.2 FL (ref 80–100)
METAMYELOCYTES RELATIVE PERCENT: 1 %
MONOCYTES ABSOLUTE: 1.8 K/UL (ref 0–1.3)
MONOCYTES RELATIVE PERCENT: 12 %
NEUTROPHILS ABSOLUTE: 11.6 K/UL (ref 1.7–7.7)
NEUTROPHILS RELATIVE PERCENT: 77 %
PDW BLD-RTO: 14.3 % (ref 12.4–15.4)
PHOSPHORUS: 3.5 MG/DL (ref 2.5–4.9)
PLATELET # BLD: 328 K/UL (ref 135–450)
PMV BLD AUTO: 9 FL (ref 5–10.5)
POTASSIUM SERPL-SCNC: 3.4 MMOL/L (ref 3.5–5.1)
RBC # BLD: 2.65 M/UL (ref 4–5.2)
SODIUM BLD-SCNC: 150 MMOL/L (ref 136–145)
STOMATOCYTES: ABNORMAL
TARGET CELLS: ABNORMAL
TEAR DROP CELLS: ABNORMAL
WBC # BLD: 14.9 K/UL (ref 4–11)

## 2022-09-14 PROCEDURE — 80069 RENAL FUNCTION PANEL: CPT

## 2022-09-14 PROCEDURE — 6360000002 HC RX W HCPCS: Performed by: STUDENT IN AN ORGANIZED HEALTH CARE EDUCATION/TRAINING PROGRAM

## 2022-09-14 PROCEDURE — 6370000000 HC RX 637 (ALT 250 FOR IP): Performed by: INTERNAL MEDICINE

## 2022-09-14 PROCEDURE — 36415 COLL VENOUS BLD VENIPUNCTURE: CPT

## 2022-09-14 PROCEDURE — 2580000003 HC RX 258: Performed by: INTERNAL MEDICINE

## 2022-09-14 PROCEDURE — 6360000002 HC RX W HCPCS: Performed by: INTERNAL MEDICINE

## 2022-09-14 PROCEDURE — 2580000003 HC RX 258: Performed by: STUDENT IN AN ORGANIZED HEALTH CARE EDUCATION/TRAINING PROGRAM

## 2022-09-14 PROCEDURE — 85025 COMPLETE CBC W/AUTO DIFF WBC: CPT

## 2022-09-14 PROCEDURE — 92526 ORAL FUNCTION THERAPY: CPT

## 2022-09-14 PROCEDURE — 1200000000 HC SEMI PRIVATE

## 2022-09-14 RX ORDER — AMLODIPINE BESYLATE 10 MG/1
10 TABLET ORAL DAILY
Status: DISCONTINUED | OUTPATIENT
Start: 2022-09-15 | End: 2022-09-15 | Stop reason: HOSPADM

## 2022-09-14 RX ADMIN — SODIUM CHLORIDE, PRESERVATIVE FREE 10 ML: 5 INJECTION INTRAVENOUS at 09:54

## 2022-09-14 RX ADMIN — AMLODIPINE BESYLATE 5 MG: 5 TABLET ORAL at 09:53

## 2022-09-14 RX ADMIN — TRAMADOL HYDROCHLORIDE 50 MG: 50 TABLET, COATED ORAL at 15:52

## 2022-09-14 RX ADMIN — HEPARIN SODIUM 5000 UNITS: 5000 INJECTION INTRAVENOUS; SUBCUTANEOUS at 20:40

## 2022-09-14 RX ADMIN — FERROUS SULFATE TAB 325 MG (65 MG ELEMENTAL FE) 325 MG: 325 (65 FE) TAB at 09:53

## 2022-09-14 RX ADMIN — TRAMADOL HYDROCHLORIDE 50 MG: 50 TABLET, COATED ORAL at 09:53

## 2022-09-14 RX ADMIN — ALPRAZOLAM 0.25 MG: 0.25 TABLET ORAL at 20:40

## 2022-09-14 RX ADMIN — HEPARIN SODIUM 5000 UNITS: 5000 INJECTION INTRAVENOUS; SUBCUTANEOUS at 06:21

## 2022-09-14 RX ADMIN — TRAMADOL HYDROCHLORIDE 50 MG: 50 TABLET, COATED ORAL at 20:41

## 2022-09-14 RX ADMIN — POTASSIUM CHLORIDE: 2 INJECTION, SOLUTION, CONCENTRATE INTRAVENOUS at 09:52

## 2022-09-14 RX ADMIN — AMPICILLIN SODIUM AND SULBACTAM SODIUM 3000 MG: 2; 1 INJECTION, POWDER, FOR SOLUTION INTRAMUSCULAR; INTRAVENOUS at 11:58

## 2022-09-14 RX ADMIN — AMPICILLIN SODIUM AND SULBACTAM SODIUM 3000 MG: 2; 1 INJECTION, POWDER, FOR SOLUTION INTRAMUSCULAR; INTRAVENOUS at 23:48

## 2022-09-14 RX ADMIN — DEXAMETHASONE SODIUM PHOSPHATE 6 MG: 4 INJECTION, SOLUTION INTRAMUSCULAR; INTRAVENOUS at 09:53

## 2022-09-14 RX ADMIN — Medication 5 MG: at 20:41

## 2022-09-14 RX ADMIN — LEVOTHYROXINE SODIUM 100 MCG: 100 TABLET ORAL at 06:22

## 2022-09-14 RX ADMIN — ALPRAZOLAM 0.25 MG: 0.25 TABLET ORAL at 09:53

## 2022-09-14 ASSESSMENT — PAIN SCALES - PAIN ASSESSMENT IN ADVANCED DEMENTIA (PAINAD)
NEGVOCALIZATION: 0
BODYLANGUAGE: 0
FACIALEXPRESSION: 0
BREATHING: 0
NEGVOCALIZATION: 0
BREATHING: 0
BODYLANGUAGE: 0
CONSOLABILITY: 0
CONSOLABILITY: 0
BREATHING: 0
BODYLANGUAGE: 0
BREATHING: 0
BODYLANGUAGE: 0
BREATHING: 0
BODYLANGUAGE: 0
TOTALSCORE: 0
FACIALEXPRESSION: 0
NEGVOCALIZATION: 0
BREATHING: 0
TOTALSCORE: 0
CONSOLABILITY: 0
NEGVOCALIZATION: 0
CONSOLABILITY: 0
FACIALEXPRESSION: 0
TOTALSCORE: 0
NEGVOCALIZATION: 0
CONSOLABILITY: 0
BREATHING: 0
TOTALSCORE: 0
TOTALSCORE: 0
CONSOLABILITY: 0
NEGVOCALIZATION: 0
CONSOLABILITY: 0
FACIALEXPRESSION: 0
TOTALSCORE: 0
BREATHING: 0
BREATHING: 0
NEGVOCALIZATION: 0
NEGVOCALIZATION: 0
BODYLANGUAGE: 0
CONSOLABILITY: 0
TOTALSCORE: 0
NEGVOCALIZATION: 0
TOTALSCORE: 0
NEGVOCALIZATION: 0
TOTALSCORE: 0
BODYLANGUAGE: 0
CONSOLABILITY: 0
FACIALEXPRESSION: 0
FACIALEXPRESSION: 0
BREATHING: 0
FACIALEXPRESSION: 0
BREATHING: 0
FACIALEXPRESSION: 0
BODYLANGUAGE: 0
FACIALEXPRESSION: 0
BODYLANGUAGE: 0
BODYLANGUAGE: 0
NEGVOCALIZATION: 0
TOTALSCORE: 0
FACIALEXPRESSION: 0
CONSOLABILITY: 0
BREATHING: 0
BODYLANGUAGE: 0
FACIALEXPRESSION: 0
NEGVOCALIZATION: 0
BREATHING: 0
FACIALEXPRESSION: 0
FACIALEXPRESSION: 0
TOTALSCORE: 0
TOTALSCORE: 0
NEGVOCALIZATION: 0
BODYLANGUAGE: 0

## 2022-09-14 ASSESSMENT — PAIN SCALES - GENERAL
PAINLEVEL_OUTOF10: 0
PAINLEVEL_OUTOF10: 3
PAINLEVEL_OUTOF10: 0
PAINLEVEL_OUTOF10: 0

## 2022-09-14 ASSESSMENT — PAIN DESCRIPTION - LOCATION: LOCATION: GENERALIZED

## 2022-09-14 ASSESSMENT — PAIN DESCRIPTION - DESCRIPTORS: DESCRIPTORS: DISCOMFORT

## 2022-09-14 ASSESSMENT — PAIN DESCRIPTION - ORIENTATION: ORIENTATION: RIGHT;LEFT

## 2022-09-14 NOTE — PROGRESS NOTES
Physician Progress Note      PATIENT:               Claudean England  CSN #:                  [id number]  :                       1924  ADMIT DATE:       2022 8:52 AM  DISCH DATE:  RESPONDING  PROVIDER #:        Elise Brown MD          QUERY TEXT:    Pt admitted with COVID-19, CHF exacerbation and noted to have increased WBC   and RR. If possible, please document in progress notes and discharge summary   if you are evaluating and/or treating: The medical record reflects the following:  Risk Factors: 79 yo w/ Covid infection  Clinical Indicators: WBC 20.9, RR 21 - 24. Procalcitonin 0.56. Per PN :    ill-appearing. Per H&P: Acute COVID 19 infection. Treatment: Lactic acid, Procalcitonin  Options provided:  -- Sepsis present on admission due to COVID-19 infection  -- Covid-19 infection without sepsis  -- Other - I will add my own diagnosis  -- Disagree - Not applicable / Not valid  -- Disagree - Clinically unable to determine / Unknown  -- Refer to Clinical Documentation Reviewer    PROVIDER RESPONSE TEXT:    This patient has Covid-19 infection without sepsis.     Query created by: Trini Jiménez on 2022 7:51 AM      Electronically signed by:  Elise Brown MD 2022 8:44 AM

## 2022-09-14 NOTE — PLAN OF CARE
Problem: Skin/Tissue Integrity  Goal: Absence of new skin breakdown  Description: 1. Monitor for areas of redness and/or skin breakdown  2. Assess vascular access sites hourly  3. Every 4-6 hours minimum:  Change oxygen saturation probe site  4. Every 4-6 hours:  If on nasal continuous positive airway pressure, respiratory therapy assess nares and determine need for appliance change or resting period.   9/14/2022 1038 by Mari Siddiqui RN  Outcome: Progressing     Problem: ABCDS Injury Assessment  Goal: Absence of physical injury  Outcome: Progressing  Flowsheets (Taken 9/14/2022 1038)  Absence of Physical Injury: Implement safety measures based on patient assessment     Problem: Respiratory - Adult  Goal: Achieves optimal ventilation and oxygenation  9/14/2022 1038 by Mari Siddiqui RN  Outcome: Progressing  Flowsheets (Taken 9/14/2022 1038)  Achieves optimal ventilation and oxygenation:   Assess for changes in respiratory status   Assess for changes in mentation and behavior   Position to facilitate oxygenation and minimize respiratory effort   Oxygen supplementation based on oxygen saturation or arterial blood gases   Encourage broncho-pulmonary hygiene including cough, deep breathe, incentive spirometry   Assess and instruct to report shortness of breath or any respiratory difficulty   Respiratory therapy support as indicated     Problem: Discharge Planning  Goal: Discharge to home or other facility with appropriate resources  Outcome: Progressing  Flowsheets (Taken 9/14/2022 1038)  Discharge to home or other facility with appropriate resources:   Identify barriers to discharge with patient and caregiver   Arrange for needed discharge resources and transportation as appropriate   Identify discharge learning needs (meds, wound care, etc)   Arrange for interpreters to assist at discharge as needed   Refer to discharge planning if patient needs post-hospital services based on physician order or complex needs related to functional status, cognitive ability or social support system     Problem: Cardiovascular - Adult  Goal: Maintains optimal cardiac output and hemodynamic stability  Outcome: Progressing  Flowsheets (Taken 9/14/2022 1038)  Maintains optimal cardiac output and hemodynamic stability:   Monitor blood pressure and heart rate   Monitor urine output and notify Licensed Independent Practitioner for values outside of normal range   Assess for signs of decreased cardiac output     Problem: Metabolic/Fluid and Electrolytes - Adult  Goal: Electrolytes maintained within normal limits  Outcome: Progressing  Flowsheets (Taken 9/14/2022 1038)  Electrolytes maintained within normal limits:   Administer electrolyte replacement as ordered   Monitor labs and assess patient for signs and symptoms of electrolyte imbalances     Problem: Metabolic/Fluid and Electrolytes - Adult  Goal: Hemodynamic stability and optimal renal function maintained  Outcome: Progressing  Flowsheets (Taken 9/14/2022 1038)  Hemodynamic stability and optimal renal function maintained:   Monitor labs and assess for signs and symptoms of volume excess or deficit   Monitor intake, output and patient weight   Monitor urine specific gravity, serum osmolarity and serum sodium as indicated or ordered   Monitor response to interventions for patient's volume status, including labs, urine output, blood pressure (other measures as available)   Encourage oral intake as appropriate   Instruct patient on fluid and nutrition restrictions as appropriate     Problem: Safety - Adult  Goal: Free from fall injury  9/14/2022 1038 by Thang Chavez RN  Outcome: Progressing  Flowsheets (Taken 9/14/2022 1038)  Free From Fall Injury: Instruct family/caregiver on patient safety     Problem: Pain  Goal: Verbalizes/displays adequate comfort level or baseline comfort level  Outcome: Progressing  Flowsheets (Taken 9/14/2022 1038)  Verbalizes/displays adequate comfort level or baseline comfort level:   Encourage patient to monitor pain and request assistance   Assess pain using appropriate pain scale   Administer analgesics based on type and severity of pain and evaluate response   Implement non-pharmacological measures as appropriate and evaluate response   Consider cultural and social influences on pain and pain management   Notify Licensed Independent Practitioner if interventions unsuccessful or patient reports new pain

## 2022-09-14 NOTE — PLAN OF CARE
Problem: Skin/Tissue Integrity  Goal: Absence of new skin breakdown  Description: 1. Monitor for areas of redness and/or skin breakdown  2. Assess vascular access sites hourly  3. Every 4-6 hours minimum:  Change oxygen saturation probe site  4. Every 4-6 hours:  If on nasal continuous positive airway pressure, respiratory therapy assess nares and determine need for appliance change or resting period.   9/13/2022 2015 by Elbert Akins RN  Outcome: Progressing     Problem: Respiratory - Adult  Goal: Achieves optimal ventilation and oxygenation  9/13/2022 2015 by Elbert Akins RN  Outcome: Progressing  Flowsheets (Taken 9/13/2022 2015)  Achieves optimal ventilation and oxygenation:   Assess for changes in respiratory status   Position to facilitate oxygenation and minimize respiratory effort   Assess for changes in mentation and behavior   Oxygen supplementation based on oxygen saturation or arterial blood gases     Problem: Discharge Planning  Goal: Discharge to home or other facility with appropriate resources  9/13/2022 2015 by Elbert Akins RN  Outcome: Progressing  Flowsheets (Taken 9/13/2022 2015)  Discharge to home or other facility with appropriate resources:   Identify barriers to discharge with patient and caregiver   Identify discharge learning needs (meds, wound care, etc)     Problem: Cardiovascular - Adult  Goal: Maintains optimal cardiac output and hemodynamic stability  9/13/2022 2015 by Elbert Akins RN  Outcome: Progressing  Flowsheets (Taken 9/13/2022 2015)  Maintains optimal cardiac output and hemodynamic stability:   Monitor blood pressure and heart rate   Assess for signs of decreased cardiac output     Problem: Cardiovascular - Adult  Goal: Absence of cardiac dysrhythmias or at baseline  9/13/2022 2015 by Elbert Akins RN  Outcome: Progressing  Flowsheets (Taken 9/13/2022 2015)  Absence of cardiac dysrhythmias or at baseline: Monitor cardiac rate and rhythm     Problem: Metabolic/Fluid and Electrolytes - Adult  Goal: Electrolytes maintained within normal limits  9/13/2022 2015 by Rere Jaquez RN  Outcome: Progressing  Flowsheets (Taken 9/13/2022 2015)  Electrolytes maintained within normal limits:   Monitor labs and assess patient for signs and symptoms of electrolyte imbalances   Monitor response to electrolyte replacements, including repeat lab results as appropriate   Administer electrolyte replacement as ordered     Problem: Safety - Adult  Goal: Free from fall injury  9/13/2022 2015 by Rere Jaquez RN  Outcome: Progressing  Flowsheets (Taken 9/13/2022 2015)  Free From Fall Injury: Instruct family/caregiver on patient safety     Problem: Pain  Goal: Verbalizes/displays adequate comfort level or baseline comfort level  9/13/2022 2015 by Rere Jaquez RN  Outcome: Progressing  Flowsheets (Taken 9/13/2022 2015)  Verbalizes/displays adequate comfort level or baseline comfort level: Encourage patient to monitor pain and request assistance

## 2022-09-14 NOTE — PLAN OF CARE
Problem: Skin/Tissue Integrity  Goal: Absence of new skin breakdown  Description: 1. Monitor for areas of redness and/or skin breakdown  2. Assess vascular access sites hourly  3. Every 4-6 hours minimum:  Change oxygen saturation probe site  4. Every 4-6 hours:  If on nasal continuous positive airway pressure, respiratory therapy assess nares and determine need for appliance change or resting period. 9/13/2022 2302 by Mayda Haywood RN  Outcome: Progressing   Patient is getting Q2 turns and repositioning to prevent her from getting bedsores/  Problem: Respiratory - Adult  Goal: Achieves optimal ventilation and oxygenation  9/13/2022 2302 by Mayda Haywood RN  Outcome: Progressing   Patient is still on oxygen but doing well on her 3L. Problem: Safety - Adult  Goal: Free from fall injury  9/13/2022 2302 by Mayda Haywood RN  Outcome: Progressing   Pt is a Fall Risk. See Cowan Dadds Fall Risk Score. Pt bed in low position and side rails up. Call light and belongings in reach. Pt encouraged to call for assistance. Will continue with hourly rounds for PO intake, pain needs, toileting, and repositioning as needed.

## 2022-09-14 NOTE — PROGRESS NOTES
Phone update given to daughter Marlena Sosa. All questions answered.     Electronically signed by Charlotte Roman RN on 9/13/2022 at 8:14 PM

## 2022-09-14 NOTE — CARE COORDINATION
CM spoke with pt's daughter over the phone. Pt is from [address] [place] [address] and plans to return at discharge. She is nonambulatory and essentially total care, but they are willing to take her back as this is her baseline. Pt is Covid-19 Positive and on 2L O2, none at baseline. Pt remains on IV abx. Pt will need transport at discharge.      Gita Meeks RN, BSN, [address]  Case Management Department  [phone]

## 2022-09-14 NOTE — PROGRESS NOTES
Interval History and plan:      Blood pressure is better   Urine is 750 ml    Plan:    Creatinine is stable at 1.5  She was hypernatremic 148>> 150 now  Continue COVID-19 treatment. Hold lasix   Continue amlodipine  Daily renal panel  UA  Restart IVF  Renal US when stable                    Assessment :     Chronic kidney disease stage III B: Current GFR is 30 mm/min. I do not have any baseline number available. We will try to obtain records from the nursing home and Monday. Echocardiogram showed EF of 60 to 65%. Chest x-ray showed pulmonary edema. Shortness of breath: It is multifactorial including pulmonary edema and COVID-19    BNP was [facility] would like to thank Fabrizio Hilliard MD   for opportunity to serve this patient      Please call with questions at-   24 Hrs Answering service [phone] or  7 am- 5 pm via Perfect serve or cell phone  Roosevelt Kramer MD          CC/reason for consult :     Acute kidney injury     HPI :     Harriett Leos is a 80 y.o. female presented to   the hospital on 9/9/2022 with shortness of breath    She has past medical history of congestive heart failure, hypertension, hyperlipidemia, hypothyroidism and osteoarthritis. She was sent from nursing home because of shortness of breath. She is experiencing shortness of breath for 2 to 3 days prior to admission. It was associated with dry cough but no chest pain. There was no fever or chills. When she arrived in the ER she was hypoxic. Chest x-ray did show pulm edema on arrival.    She was tested for COVID-19 infection and came back positive. Also noted was elevated creatinine. Arrival creatinine was 1.8 with a BUN of 60. I do not have any baseline creatinine.     ROS:     Seen with-no family in the room    positives in bold   Hard of hearing                 All other remaining systems are negative or unable to obtain        PMH/PSH/SH/Family History:     Past Medical History: Diagnosis Date    Anxiety     Arthritis     CHF (congestive heart failure) (HCC)     GERD (gastroesophageal reflux disease)     Hyperlipidemia     Hypertension     Thyroid disease        History reviewed. No pertinent surgical history. reports that she does not currently use alcohol. She reports that she does not currently use drugs. family history is not on file.          Medication:     Current Facility-Administered Medications: amLODIPine (NORVASC) tablet 5 mg, 5 mg, Oral, Daily  ampicillin-sulbactam (UNASYN) 3000 mg in 100 mL NS IVPB minibag, 3,000 mg, IntraVENous, Q12H  hydrALAZINE (APRESOLINE) injection 10 mg, 10 mg, IntraVENous, Q6H PRN  dexamethasone (DECADRON) injection 6 mg, 6 mg, IntraVENous, Q24H  ALPRAZolam (XANAX) tablet 0.25 mg, 0.25 mg, Oral, BID  diclofenac sodium (VOLTAREN) 1 % gel 1 g, 1 g, Topical, BID PRN  diphenhydrAMINE (BENADRYL) tablet 25 mg, 25 mg, Oral, Q6H PRN  docusate sodium (COLACE) capsule 100 mg, 100 mg, Oral, BID  ferrous sulfate (IRON 325) tablet 325 mg, 325 mg, Oral, Daily with breakfast  ipratropium-albuterol (DUONEB) nebulizer solution 3 mL, 1 vial, Inhalation, Q4H PRN  levothyroxine (SYNTHROID) tablet 100 mcg, 100 mcg, Oral, Daily  melatonin disintegrating tablet 5 mg, 5 mg, Oral, Nightly  sodium chloride (OCEAN, BABY AYR) 0.65 % nasal spray 1 spray, 1 spray, Nasal, Q8H PRN  traMADol (ULTRAM) tablet 50 mg, 50 mg, Oral, TID  0.9 % sodium chloride bolus, 30 mL, IntraVENous, PRN  sodium chloride flush 0.9 % injection 5-40 mL, 5-40 mL, IntraVENous, 2 times per day  sodium chloride flush 0.9 % injection 5-40 mL, 5-40 mL, IntraVENous, PRN  0.9 % sodium chloride infusion, , IntraVENous, PRN  ondansetron (ZOFRAN-ODT) disintegrating tablet 4 mg, 4 mg, Oral, Q8H PRN **OR** ondansetron (ZOFRAN) injection 4 mg, 4 mg, IntraVENous, Q6H PRN  polyethylene glycol (GLYCOLAX) packet 17 g, 17 g, Oral, Daily PRN  acetaminophen (TYLENOL) tablet 650 mg, 650 mg, Oral, Q6H PRN **OR**

## 2022-09-14 NOTE — PROGRESS NOTES
Hospitalist Progress Note      PCP: Miguelina Bright MD    Date of Admission: 9/9/2022    Chief Complaint: Shortness of breath    Hospital Course: Presented from nursing home for shortness of breath. Known to have chronic CHF. Evaluated by cardiology. No findings to support acute CHF. Also tested positive for COVID-19 in addition to the finding of elevated creatinine on arrival.  Noted requires oxygen. Subjective: Pt likely near her baseline. O2 requirement decreasing. No new complaints though ROS not completely accurate given patient's mentation     Medications:  Reviewed    Infusion Medications    IV infusion builder 100 mL/hr at 09/14/22 0952    sodium chloride       Scheduled Medications    amLODIPine  5 mg Oral Daily    ampicillin-sulbactam  3,000 mg IntraVENous Q12H    dexamethasone  6 mg IntraVENous Q24H    ALPRAZolam  0.25 mg Oral BID    docusate sodium  100 mg Oral BID    ferrous sulfate  325 mg Oral Daily with breakfast    levothyroxine  100 mcg Oral Daily    melatonin  5 mg Oral Nightly    traMADol  50 mg Oral TID    sodium chloride flush  5-40 mL IntraVENous 2 times per day    heparin (porcine)  5,000 Units SubCUTAneous 3 times per day     PRN Meds: hydrALAZINE, diclofenac sodium, diphenhydrAMINE, ipratropium-albuterol, sodium chloride, sodium chloride, sodium chloride flush, sodium chloride, ondansetron **OR** ondansetron, polyethylene glycol, acetaminophen **OR** acetaminophen, guaiFENesin-dextromethorphan, benzonatate      Intake/Output Summary (Last 24 hours) at 9/14/2022 1211  Last data filed at 9/13/2022 2010  Gross per 24 hour   Intake 220 ml   Output 750 ml   Net -530 ml       Physical Exam Performed:    BP (!) 160/80   Pulse 69   Temp 98.6 °F (37 °C) (Oral)   Resp 16   Ht 5' 6\" (1.676 m)   Wt 114 lb 10.2 oz (52 kg)   SpO2 92%   BMI 18.50 kg/m²     General appearance: No apparent distress, appears stated age, ill-appearing. Frail.   HEENT: Pupils equal, round, and reactive to light. Conjunctivae/corneas clear. Neck: Supple, with full range of motion. No jugular venous distention. Trachea midline. Respiratory: Decreased air movement, few scattered rhonchi. Cardiovascular: Regular rate and rhythm with normal S1/S2 without murmurs, rubs or gallops. Abdomen: Soft, non-tender, non-distended with normal bowel sounds. Musculoskeletal: No clubbing, cyanosis or edema bilaterally. Full range of motion without deformity. Skin: Skin color, texture, turgor normal.  No rashes or lesions. Neurologic: Difficult exam.  Probably nonfocal given no grossly detectable focal findings. Could not test cranial nerves due to lack of cooperation. Psychiatric: Alert and confused  Capillary Refill: Brisk, 3 seconds, normal   Peripheral Pulses: +2 palpable, equal bilaterally       Labs:   Recent Labs     09/12/22  0436 09/14/22  0525   WBC 14.1* 14.9*   HGB 8.5* 8.4*   HCT 25.3* 25.7*    328     Recent Labs     09/12/22  0436 09/13/22  0710 09/14/22  0525   * 143 150*   K 3.5 3.5 3.4*    104 110   CO2 23 22 26   BUN 68* 69* 63*   CREATININE 1.6* 1.5* 1.5*   CALCIUM 8.4 7.9* 8.3   PHOS 4.7 3.5 3.5     Recent Labs     09/12/22  0436   AST 27   ALT 27   BILITOT 0.3   ALKPHOS 221*     No results for input(s): INR in the last 72 hours. No results for input(s): [place] in the last 72 hours. Urinalysis:    No results found for: Sae Margret, BACTERIA, RBCUA, BLOODU, [address], [place] [address]    Radiology:  XR CHEST PORTABLE   Final Result   Impression: Multifocal pulmonary consolidative opacities, left greater than right, slightly increased in the interval.      FL MODIFIED BARIUM SWALLOW W VIDEO   Final Result   1. Deep penetration with thin liquids and with nectar consistencies. No aspiration visualized. 2. Oral dysphagia. VL Extremity Venous Bilateral         US RENAL COMPLETE   Final Result   1. Small atrophic appearing kidneys with no hydronephrosis.       XR CHEST PORTABLE Final Result   1. Pulmonary edema. 2.  Small left pleural effusion. 3.  Age-indeterminate fractures of left ribs 8 and 9. In correlation with point tenderness. Assessment/Plan:    Active Hospital Problems    Diagnosis     Acute COVID-19 [U07.1]      Priority: Medium    Elevated BUN [R79.9]      Priority: Medium    Elevated serum creatinine [R79.89]      Priority: Medium    Normocytic normochromic anemia [D64.9]      Priority: Medium    Leukocytosis [D72.829]      Priority: Medium     PLAN:    Acute hypoxic respiratory failure  Secondary to COVID-19. Per cardiology, patient does not have acute CHF. We will continue oxygen supplementation as needed  Continue Unasyn for aspiration PNA, can likely be discharged gatito  course of Augmentin  D-dimer was high on admit, suspected 2/2 Covid. Will f/u on LE doppler for DVTs (cannot perform CTA or VQ)     Acute COVID-19 infection  Tested positive 6 days ago. Continue remdesivir, examethasone. Suspected chronic kidney disease  Creatinine was 1.8 on admit, 1.5 today. Given patient's age, gives the impression of advanced CKD with a possibly acute component. Continue to monitor. Given lack of baseline, I will ask nephrology to evaluate. Anemia  Gives the impression of being chronic, with no acute blood loss. Monitor and transfuse if indicated. Hypothyroidism  Continue same dose of Synthroid as before     Elevated troponin  Consistent with demand ischemia. Not a candidate for ischemic evaluation even if patient turns out to have true ACS. DVT Prophylaxis: Continue subcutaneous heparin  Diet: ADULT DIET; Dysphagia - Pureed; Mildly Thick (Nectar)  Code Status: Discussed with daughter today who confirmed pt has living will that states she is a DNR and agrees with what her mother wants. Will change CODE STATUS to DNR/DNI  PT/OT Eval Status: Cannot tolerate today.     Dispo - inpatient Can likely be discharged in 1-2 days      Renuka Reardon MD

## 2022-09-14 NOTE — PROGRESS NOTES
Speech Language Pathology  Facility/Department: [facility] PCU  Dysphagia Daily Treatment Note    NAME: Angel Ha  : 1924  MRN: [id number]    Patient Diagnosis(es):   Patient Active Problem List    Diagnosis Date Noted    Acute combined systolic and diastolic CH[facility], [facility] class 2 [facility] 2022    Acute COVID-19 2022    Elevated BUN 2022    Elevated serum creatinine 2022    Normocytic normochromic anemia 2022    Leukocytosis 2022       Recent Chest Xray: (2022)  Impression   1. Pulmonary edema. 2.  Small left pleural effusion. 3.  Age-indeterminate fractures of left ribs 8 and 9. In correlation with point tenderness     Recent Chest Xray: (2022)     Impression   Impression: Multifocal pulmonary consolidative opacities, left greater than right, slightly increased in the interval.     Chart reviewed. Medical Diagnosis: Acute combined systolic and diastolic CHF, NYHA class 2 ([facility]) [I50.41]  Acute on chronic congestive heart failure, unspecified heart failure type ([facility] [I50.9]  COVID [U07.1]   Treatment Diagnosis:dysphagia     BSE Impression 9/10/22-  Dysphagia Impression : Needs further assessment. Oral motor exam limited by inconsistent direction following (pt very [place], confused). Limited dentition. Oral cavity appears moist, clean. With patient seated up in bed on room air, trialed ice chips, thins via tsp/cup/straw, puree. Pt with positive oral acceptance, positive swallow movement, good oral clearance. Inconsistent dry cough present (both with and without PO). Reduced with small, controlled straw sips. Recommend frequent oral hygiene (x3 daily), and puree diet, thin liquids via straw (small sips only), monitoring tolerance. Recommend instrumental assessment as appropriate (unable at this time d/t weekend scheduling/staffing limitations). Will continue to follow.   Dysphagia Diagnosis: Suspected needs further assessment    MBS results - 2022  Pt presents with moderate oral dysphagia with mild pharyngeal dysphagia  Oral- pt with prolonged mastication with solids. No difficulty with puree and no difficulty drawing thin and nectar thick liquids up straw. Pharyngeal- pt with impaired hyolaryngeal mechanics resulting in incomplete epiglottal closure. Pt noted to spontaneously take very large amounts by straw- did not attempt cup as pt unable to hold independently. With thin by straw, pt with deep penetration with eventual aspiration when allowed to consume successive swallows. Pt noted to spontaneously cough to clear aspirated material from the airway. When was given smaller sip of thin by straw, pt was stopped after taking second swallow which resulted in deep penetration to the cords. Did not attempt chin tuck, head turns or bolus hold strategies as pt is very [place] and cognitively impaired so would not be able to  perform independently. Shallow penetration with spontaneous clearing noted with nectar thick liquid by straw. No aspiration or penetration noted with pureed or cracker. Pt had no residue remaining in valleculae or pyriform after the swallow with any consistency. Pain: did not state     Current Diet : ADULT DIET; Regular; Low Sodium (2 gm) -9/12- recommend downgrading diet to dysphagia III/soft and bite sized   Recommended Form of Meds: Crushed in puree as able  Compensatory Swallowing Strategies : Eat/Feed slowly, Upright as possible for all oral intake, Remain upright for 30-45 minutes after meals, External pacing     Treatment:  Pt seen bedside to address the following goals:    1: Patient will participate in further assessment of swallow function as appropriate. 9/12- RN reported no concerns re: swallowing or lung status. Pt currently on regular diet, although pureed recommended on 9/10. RN reported no concerns with regular diet. Per hard chart, pt on regular diet at [facility]. On this date, pt with persistent coughing upon entering pt's room. With first few trials with water by straw and applesauce, pt noted to cough prior to and after the swallow. Once coughing subsided, pt was able to consume 3oz water continuously with no s/s aspiration. Pt with limited dentition. Pt with moderate difficulty with mastication with cracker, stating \"I can't chew that, it's too hard\". Pt with mild-moderately prolonged mastication with sausage and no difficulty with oatmeal or applesauce. Given that pt is COVID +, persistent coughing likely due to COVID and not aspiration. Will con't to assess for need for instrumental assessment. Recommend downgrading diet to dysphagia III/soft and bite sized. Con't goal   9/13- goal met- competed MBS     New goals-  1-The patient will tolerate least restrictive diet without s/s aspiration or respiratory decline. 9/14: Per rn, pt has been coughing frequently. Pt seen bedside, on room air, awake, alert, pleasantly confused, [place]. Positioned upright and trialed nectar via controlled small straw sips and applesauce. Pt with positive oral acceptance, positive swallow movement, good oral clearance. No consistent cough (occurred x1, with and w/o PO). Noted diet currently states puree + nectar; recommend advance to prior recommendation for Minced and Moist as per daughter's preference (phone conversation). Cont goal     2-The pt/caregiver will demonstrate understanding of swallowing recommendations and concerns. 9/13-   The pt was educated to the result of MBS. Pt indicated comprehension but would benefit from reinforcement. Educated daughter, Waldo Oliver, via phone to results of MBS, concern for aspiration,  diet recommendations (thin vs nectar thick liquids) swallowing strategies (single sips if upgrade to thin) and quality of life considerations given that pt is 80years old.   Daughter stated comprehension and decided to try minced and moist diet with nectar thick liquids, but if pt objects to nectar thick liquids then will upgrade to thin liquids by single sips for quality of life given that pt is 80years old. con't goal  9/14: received pt alone, no visitors/family present. Educated to purpose of visit, swallow function, aspiration concerns, yesterday's MBS. No indication of comprehension  Cont goal as appropriate       Patient/Family/Caregiver Education:  See goal 2 above    Compensatory Strategies:  Eat/Feed slowly, Upright as possible for all oral intake, Remain upright for 30-45 minutes after meals, External pacing      Plan:  Continue dysphagia treatment with goals per plan of care. Diet recommendations: Minced & Moist + Nectar (mildly thick) by single, straw sips only, meds in puree  - general aspiration precautions  **oral hygiene x2 daily      DC recommendation:TBD closer to discharge   Treatment: 15  D/W nursingNano   Needs met prior to leaving room, call button in reach. [facility], [place], [place], [id number]  Pg.  # [phone]       If patient is discharged prior to next treatment, this note will serve as the discharge summary

## 2022-09-15 VITALS
HEART RATE: 66 BPM | OXYGEN SATURATION: 94 % | RESPIRATION RATE: 16 BRPM | TEMPERATURE: 98 F | BODY MASS INDEX: 18.16 KG/M2 | WEIGHT: 113 LBS | DIASTOLIC BLOOD PRESSURE: 77 MMHG | SYSTOLIC BLOOD PRESSURE: 153 MMHG | HEIGHT: 66 IN

## 2022-09-15 LAB
ALBUMIN SERPL-MCNC: 2.9 G/DL (ref 3.4–5)
ANION GAP SERPL CALCULATED.3IONS-SCNC: 14 MMOL/L (ref 3–16)
ANISOCYTOSIS: ABNORMAL
BASOPHILS ABSOLUTE: 0.1 K/UL (ref 0–0.2)
BASOPHILS RELATIVE PERCENT: 0.6 %
BUN BLDV-MCNC: 51 MG/DL (ref 7–20)
CALCIUM SERPL-MCNC: 7.6 MG/DL (ref 8.3–10.6)
CHLORIDE BLD-SCNC: 106 MMOL/L (ref 99–110)
CO2: 22 MMOL/L (ref 21–32)
CREAT SERPL-MCNC: 1.1 MG/DL (ref 0.6–1.2)
EOSINOPHILS ABSOLUTE: 0 K/UL (ref 0–0.6)
EOSINOPHILS RELATIVE PERCENT: 0.1 %
GFR AFRICAN AMERICAN: 56
GFR NON-AFRICAN AMERICAN: 46
GLUCOSE BLD-MCNC: 140 MG/DL (ref 70–99)
HCT VFR BLD CALC: 28.5 % (ref 36–48)
HEMATOLOGY PATH CONSULT: NORMAL
HEMOGLOBIN: 9.3 G/DL (ref 12–16)
HYPERSEGMENTED NEUTROPHILS: PRESENT
LYMPHOCYTES ABSOLUTE: 1.7 K/UL (ref 1–5.1)
LYMPHOCYTES RELATIVE PERCENT: 9.4 %
MCH RBC QN AUTO: 32 PG (ref 26–34)
MCHC RBC AUTO-ENTMCNC: 32.7 G/DL (ref 31–36)
MCV RBC AUTO: 98 FL (ref 80–100)
METHYLMALONIC ACID: 0.25 UMOL/L (ref 0–0.4)
MONOCYTES ABSOLUTE: 2.1 K/UL (ref 0–1.3)
MONOCYTES RELATIVE PERCENT: 11.3 %
NEUTROPHILS ABSOLUTE: 14.2 K/UL (ref 1.7–7.7)
NEUTROPHILS RELATIVE PERCENT: 78.6 %
PDW BLD-RTO: 14.7 % (ref 12.4–15.4)
PHOSPHORUS: 2 MG/DL (ref 2.5–4.9)
PLATELET # BLD: 322 K/UL (ref 135–450)
PMV BLD AUTO: 9.2 FL (ref 5–10.5)
POIKILOCYTES: ABNORMAL
POTASSIUM SERPL-SCNC: 4.2 MMOL/L (ref 3.5–5.1)
RBC # BLD: 2.91 M/UL (ref 4–5.2)
SODIUM BLD-SCNC: 142 MMOL/L (ref 136–145)
TEAR DROP CELLS: ABNORMAL
WBC # BLD: 18.1 K/UL (ref 4–11)

## 2022-09-15 PROCEDURE — 97162 PT EVAL MOD COMPLEX 30 MIN: CPT

## 2022-09-15 PROCEDURE — 6360000002 HC RX W HCPCS: Performed by: INTERNAL MEDICINE

## 2022-09-15 PROCEDURE — 85025 COMPLETE CBC W/AUTO DIFF WBC: CPT

## 2022-09-15 PROCEDURE — 2580000003 HC RX 258: Performed by: STUDENT IN AN ORGANIZED HEALTH CARE EDUCATION/TRAINING PROGRAM

## 2022-09-15 PROCEDURE — 97535 SELF CARE MNGMENT TRAINING: CPT

## 2022-09-15 PROCEDURE — 6360000002 HC RX W HCPCS: Performed by: STUDENT IN AN ORGANIZED HEALTH CARE EDUCATION/TRAINING PROGRAM

## 2022-09-15 PROCEDURE — 6370000000 HC RX 637 (ALT 250 FOR IP): Performed by: INTERNAL MEDICINE

## 2022-09-15 PROCEDURE — 97530 THERAPEUTIC ACTIVITIES: CPT

## 2022-09-15 PROCEDURE — 97166 OT EVAL MOD COMPLEX 45 MIN: CPT

## 2022-09-15 PROCEDURE — 80069 RENAL FUNCTION PANEL: CPT

## 2022-09-15 PROCEDURE — 6370000000 HC RX 637 (ALT 250 FOR IP): Performed by: STUDENT IN AN ORGANIZED HEALTH CARE EDUCATION/TRAINING PROGRAM

## 2022-09-15 PROCEDURE — 92526 ORAL FUNCTION THERAPY: CPT

## 2022-09-15 PROCEDURE — 2580000003 HC RX 258: Performed by: INTERNAL MEDICINE

## 2022-09-15 RX ORDER — AMOXICILLIN AND CLAVULANATE POTASSIUM 875; 125 MG/1; MG/1
1 TABLET, FILM COATED ORAL 2 TIMES DAILY
Qty: 10 TABLET | Refills: 0 | Status: SHIPPED | OUTPATIENT
Start: 2022-09-15 | End: 2022-09-15 | Stop reason: SDUPTHER

## 2022-09-15 RX ORDER — BENZONATATE 100 MG/1
100 CAPSULE ORAL 3 TIMES DAILY PRN
Qty: 21 CAPSULE | Refills: 0 | Status: SHIPPED | OUTPATIENT
Start: 2022-09-15 | End: 2022-09-15 | Stop reason: SDUPTHER

## 2022-09-15 RX ORDER — GUAIFENESIN/DEXTROMETHORPHAN 100-10MG/5
5 SYRUP ORAL EVERY 4 HOURS PRN
Qty: 120 ML | Refills: 0 | Status: SHIPPED | OUTPATIENT
Start: 2022-09-15 | End: 2022-09-25

## 2022-09-15 RX ORDER — AMOXICILLIN AND CLAVULANATE POTASSIUM 875; 125 MG/1; MG/1
1 TABLET, FILM COATED ORAL DAILY
Qty: 5 TABLET | Refills: 0 | Status: SHIPPED | OUTPATIENT
Start: 2022-09-15 | End: 2022-09-15 | Stop reason: SDUPTHER

## 2022-09-15 RX ORDER — METHYLPREDNISOLONE 4 MG/1
TABLET ORAL
Qty: 1 KIT | Refills: 0 | Status: SHIPPED | OUTPATIENT
Start: 2022-09-15 | End: 2022-09-21

## 2022-09-15 RX ORDER — METHYLPREDNISOLONE 4 MG/1
TABLET ORAL
Qty: 1 KIT | Refills: 0 | Status: SHIPPED | OUTPATIENT
Start: 2022-09-15 | End: 2022-09-15 | Stop reason: SDUPTHER

## 2022-09-15 RX ORDER — AMOXICILLIN AND CLAVULANATE POTASSIUM 875; 125 MG/1; MG/1
1 TABLET, FILM COATED ORAL DAILY
Qty: 5 TABLET | Refills: 0 | Status: SHIPPED | OUTPATIENT
Start: 2022-09-15 | End: 2022-09-20

## 2022-09-15 RX ORDER — BENZONATATE 100 MG/1
100 CAPSULE ORAL 3 TIMES DAILY PRN
Qty: 21 CAPSULE | Refills: 0 | Status: SHIPPED | OUTPATIENT
Start: 2022-09-15 | End: 2022-09-22

## 2022-09-15 RX ORDER — GUAIFENESIN/DEXTROMETHORPHAN 100-10MG/5
5 SYRUP ORAL EVERY 4 HOURS PRN
Qty: 120 ML | Refills: 0 | Status: SHIPPED | OUTPATIENT
Start: 2022-09-15 | End: 2022-09-15 | Stop reason: SDUPTHER

## 2022-09-15 RX ADMIN — TRAMADOL HYDROCHLORIDE 50 MG: 50 TABLET, COATED ORAL at 09:58

## 2022-09-15 RX ADMIN — HEPARIN SODIUM 5000 UNITS: 5000 INJECTION INTRAVENOUS; SUBCUTANEOUS at 06:06

## 2022-09-15 RX ADMIN — AMLODIPINE BESYLATE 10 MG: 10 TABLET ORAL at 09:58

## 2022-09-15 RX ADMIN — DEXAMETHASONE SODIUM PHOSPHATE 6 MG: 4 INJECTION, SOLUTION INTRAMUSCULAR; INTRAVENOUS at 09:58

## 2022-09-15 RX ADMIN — SODIUM CHLORIDE 25 ML: 9 INJECTION, SOLUTION INTRAVENOUS at 11:08

## 2022-09-15 RX ADMIN — AMPICILLIN SODIUM AND SULBACTAM SODIUM 3000 MG: 2; 1 INJECTION, POWDER, FOR SOLUTION INTRAMUSCULAR; INTRAVENOUS at 11:10

## 2022-09-15 RX ADMIN — HYDRALAZINE HYDROCHLORIDE 10 MG: 20 INJECTION INTRAMUSCULAR; INTRAVENOUS at 00:15

## 2022-09-15 RX ADMIN — ALPRAZOLAM 0.25 MG: 0.25 TABLET ORAL at 09:58

## 2022-09-15 RX ADMIN — LEVOTHYROXINE SODIUM 100 MCG: 100 TABLET ORAL at 06:06

## 2022-09-15 RX ADMIN — SODIUM CHLORIDE, PRESERVATIVE FREE 10 ML: 5 INJECTION INTRAVENOUS at 09:59

## 2022-09-15 ASSESSMENT — PAIN SCALES - PAIN ASSESSMENT IN ADVANCED DEMENTIA (PAINAD)
NEGVOCALIZATION: 0
CONSOLABILITY: 0
BODYLANGUAGE: 0
FACIALEXPRESSION: 0
NEGVOCALIZATION: 0
FACIALEXPRESSION: 0
FACIALEXPRESSION: 0
BREATHING: 0
NEGVOCALIZATION: 0
BREATHING: 0
BODYLANGUAGE: 0
BREATHING: 0
BREATHING: 0
BODYLANGUAGE: 0
BREATHING: 0
NEGVOCALIZATION: 0
BODYLANGUAGE: 0
FACIALEXPRESSION: 0
TOTALSCORE: 0
BREATHING: 0
TOTALSCORE: 0
CONSOLABILITY: 0
TOTALSCORE: 0
NEGVOCALIZATION: 0
FACIALEXPRESSION: 0
BODYLANGUAGE: 0
TOTALSCORE: 0
TOTALSCORE: 0
BODYLANGUAGE: 0
CONSOLABILITY: 0
FACIALEXPRESSION: 0
BREATHING: 0
FACIALEXPRESSION: 0
FACIALEXPRESSION: 0
CONSOLABILITY: 0
CONSOLABILITY: 0
BREATHING: 0
CONSOLABILITY: 0
NEGVOCALIZATION: 0
CONSOLABILITY: 0
TOTALSCORE: 0
NEGVOCALIZATION: 0
BODYLANGUAGE: 0
BODYLANGUAGE: 0
TOTALSCORE: 0
CONSOLABILITY: 0
NEGVOCALIZATION: 0
TOTALSCORE: 0

## 2022-09-15 ASSESSMENT — PAIN SCALES - GENERAL
PAINLEVEL_OUTOF10: 0

## 2022-09-15 NOTE — PROGRESS NOTES
Occupational Therapy  Facility/Department: [place] [address] PCU  Occupational Therapy Initial Assessment    Name: Margaret Greenberg  : 1924  MRN: [id number]  Date of Service: 9/15/2022    Discharge Recommendations: Margaret Greenberg scored a 9/24 on the AM-PAC ADL Inpatient form. At this time, no further OT is recommended upon discharge due to patient at her baseline. Recommend patient returns to prior setting with prior services. OT Equipment Recommendations  Equipment Needed: No       Patient Diagnosis(es): The primary encounter diagnosis was COVID. A diagnosis of Acute on chronic congestive heart failure, unspecified heart failure type [facility]) was also pertinent to this visit. Past Medical History:  has a past medical history of Anxiety, Arthritis, CHF (congestive heart failure) ([address] GERD (gastroesophageal reflux disease), Hyperlipidemia, Hypertension, and Thyroid disease. Past Surgical History:  has no past surgical history on file. Assessment   Assessment: Pt is from AL and reported total dependence with all ADLs and fx mobility. Pt required dependence of 1 for rolling and dependence of 2 for scooting. Pt required total A for LB dressing and toileting w/ puwick. Pt requires max A for self feeding and is on a modified diet. Pt expresses pain with all movement. Recommend pt return to AL with total care. No further acute OT needs at this time and will sign off. Decision Making: Medium Complexity  REQUIRES OT FOLLOW-UP: No  Activity Tolerance  Activity Tolerance: Patient limited by pain  Activity Tolerance Comments: limited by pts hard hearing; pt having pain with all movement; pt unable to attempt sitting at edge of bed        Plan   Plan  Times per Week: d/c acute OT     Restrictions  Position Activity Restriction  Other position/activity restrictions: up with assist    Subjective   General  Chart Reviewed: Yes  Patient assessed for rehabilitation services?: Yes  Additional Pertinent Hx: Margaret Greenberg is a 80 y.o. female with a past medical history of hypertension, hypothyroidism, and congestive heart failure, nursing home resident, presented to the [facility] emergency department by EMS from nursing home for evaluation of shortness of breath. Reportedly patient has been experiencing difficulty breathing and shortness of breath about 2 to 3 days prior to admission, persistent, associated with cough, persistent cough, dry, no associated pain. Upon presentation she was noted to be hypoxic in the 80s to 90s on room air. Chest x-ray on presentation showed evidence of pulmonary edema, as such her hypoxemia was attributed to fluid overload. Other positive findings on presentation, positive for COVID-19 infection, elevated creatinine and BUN, bilirubinemia and leukocytosis. Pt tested COVID +  Family / Caregiver Present: No  Referring Practitioner: Paige Russo MD  Diagnosis: COVID +  Subjective  Subjective: Pt lying supine in bed upon OT arrival. Pt extremely hard of hearing. Pt stating, \"I don't walk. They help me with everything. \"     Social/Functional History  Social/Functional History  Additional Comments: per CM note \"Pt is from [address] [place] [address] and plans to return at discharge. She is nonambulatory and essentially total care, but they are willing to take her back as this is her baseline\" pt reports using peter to get to wc and that she can feed herself but she gets assist for all other care.        Objective   Heart Rate: 55  Heart Rate Source: Monitor  BP: (!) 140/70  BP Location: Right upper arm  BP Method: Automatic  Patient Position: Semi fowlers  MAP (Calculated): 93.33  Resp: 16  SpO2: 95 %  O2 Device: None (Room air)          Observation/Palpation  Observation: life alert necklace donned; puwick in place  Safety Devices  Type of Devices: Left in bed;Bed alarm in place;Call light within reach;Nurse notified        AROM: Grossly decreased, non-functional  PROM: Generally decreased, functional (pt yells out in pain)  Strength: Grossly decreased, non-functional  Coordination: Grossly decreased, non-functional  Tone: Normal  Sensation: Intact  ADL  Feeding: Bringing food to mouth assist;Scoop assist;Pureed diet; Thickened liquids; Increased time to complete;Maximum assistance  Feeding Skilled Clinical Factors: pt provided pureed breakfast with thickened OJ at end of session; pt required max A and only tolerate small PO intake. Pt reported \"that's enough of that\". Grooming: Setup;Verbal cueing; Moderate assistance  Grooming Skilled Clinical Factors: Pt able to wash face with warm wash cloth; pt required mod A for thoroughness  UE Dressing: Maximum assistance  UE Dressing Skilled Clinical Factors: to don new gown  LE Dressing: Dependent/Total  LE Dressing Skilled Clinical Factors: to don/doff prevalone boots; don socks  Toileting: Dependent/Total  Toileting Skilled Clinical Factors: use of purwick; pt reports dependence at baseline  Additional Comments: Pt incredibly hard of hearing; pt with pain when all joints are moved/positioned     Activity Tolerance  Activity Tolerance: Patient tolerated evaluation without incident  Activity Tolerance Comments: limited eval due to pt dependent at baseline  Bed mobility  Rolling to Left: Dependent/Total  Rolling to Right: Dependent/Total  Supine to Sit: Unable to assess (pt reports she does not perform this task at baseline)  Scooting: Dependent/Total;2 Person assistance  Bed Mobility Comments: no initiation- pain with all movement     Vision  Vision: Impaired  Vision Exceptions: Wears glasses for reading  Hearing  Hearing: Exceptions to [facility] (reports she does not wear hearing aids)  Hearing Exceptions: Hard of hearing/hearing concerns  Cognition  Overall Cognitive Status: Exceptions  Following Commands:  Follows one step commands with increased time  Attention Span: Attends with cues to redirect  Memory: Decreased recall of biographical Information;Decreased recall of recent events;Decreased short term memory;Decreased long term memory  Insights: Decreased awareness of deficits  Initiation: Requires cues for all  Sequencing: Requires cues for all  Cognition Comment: dementia at baseline, very hard of hearing and hard to communicate with due to hearing loss  Orientation  Orientation Level: Oriented to person;Disoriented to situation;Disoriented to time (knew we were in a hospital but not the name, knew it was 2020 something but not the month, did not know year of her birthday or why she was here.)                  Education Given To: Patient  Education Provided: Role of Therapy  Barriers to Learning: Hearing  Education Outcome: Verbalized understanding                        G-Code     OutComes Score                                                  AM-PAC Score        AM-PAC Inpatient Daily Activity Raw Score: 9 (09/15/22 0922)  AM-PAC Inpatient ADL T-Scale Score : 25.33 (09/15/22 0922)  ADL Inpatient CMS 0-100% Score: 79.59 (09/15/22 0922)  ADL Inpatient CMS G-Code Modifier : CL (09/15/22 5038)         Therapy Time   Individual Concurrent Group Co-treatment   Time In 0838         Time Out 0916         Minutes 38         Timed Code Treatment Minutes: 23 Minutes (+ 15 min OT eval)       Lynda Kong OT

## 2022-09-15 NOTE — PROGRESS NOTES
Speech Language Pathology  Facility/Department: [facility] 4 PCU  Dysphagia Daily Treatment Note    NAME: Angel Ha  : 1924  MRN: [id number]    Patient Diagnosis(es):   Patient Active Problem List    Diagnosis Date Noted    Acute combined systolic and diastolic CHF, NYHA class 2 ([facility] [address] 2022    Acute COVID-19 2022    Elevated BUN 2022    Elevated serum creatinine 2022    Normocytic normochromic anemia 2022    Leukocytosis 2022       Recent Chest Xray: (2022)  Impression   1. Pulmonary edema. 2.  Small left pleural effusion. 3.  Age-indeterminate fractures of left ribs 8 and 9. In correlation with point tenderness     Recent Chest Xray: (2022)     Impression   Impression: Multifocal pulmonary consolidative opacities, left greater than right, slightly increased in the interval.     Chart reviewed. Medical Diagnosis: Acute combined systolic and diastolic CHF, NYHA class 2 (HCC) [I50.41]  Acute on chronic congestive heart failure, unspecified heart failure type ([address] [I50.9]  COVID [U07.1]   Treatment Diagnosis:dysphagia     BSE Impression 9/10/22-  Dysphagia Impression : Needs further assessment. Oral motor exam limited by inconsistent direction following (pt very [place], confused). Limited dentition. Oral cavity appears moist, clean. With patient seated up in bed on room air, trialed ice chips, thins via tsp/cup/straw, puree. Pt with positive oral acceptance, positive swallow movement, good oral clearance. Inconsistent dry cough present (both with and without PO). Reduced with small, controlled straw sips. Recommend frequent oral hygiene (x3 daily), and puree diet, thin liquids via straw (small sips only), monitoring tolerance. Recommend instrumental assessment as appropriate (unable at this time d/t weekend scheduling/staffing limitations). Will continue to follow.   Dysphagia Diagnosis: Suspected needs further assessment    MBS results - 2022  Pt presents with moderate oral dysphagia with mild pharyngeal dysphagia  Oral- pt with prolonged mastication with solids. No difficulty with puree and no difficulty drawing thin and nectar thick liquids up straw. Pharyngeal- pt with impaired hyolaryngeal mechanics resulting in incomplete epiglottal closure. Pt noted to spontaneously take very large amounts by straw- did not attempt cup as pt unable to hold independently. With thin by straw, pt with deep penetration with eventual aspiration when allowed to consume successive swallows. Pt noted to spontaneously cough to clear aspirated material from the airway. When was given smaller sip of thin by straw, pt was stopped after taking second swallow which resulted in deep penetration to the cords. Did not attempt chin tuck, head turns or bolus hold strategies as pt is very [place] and cognitively impaired so would not be able to  perform independently. Shallow penetration with spontaneous clearing noted with nectar thick liquid by straw. No aspiration or penetration noted with pureed or cracker. Pt had no residue remaining in valleculae or pyriform after the swallow with any consistency. Pain: did not state     Current Diet : Pureed/nectar thick- 9/15 recommend upgrade diet to minced and moist with nectar thick liquids     Recommended Form of Meds: Crushed in puree as able  Compensatory Swallowing Strategies : Eat/Feed slowly, Upright as possible for all oral intake, Remain upright for 30-45 minutes after meals, External pacing     Treatment:  Pt seen bedside to address the following goals:    1: Patient will participate in further assessment of swallow function as appropriate. 9/12- RN reported no concerns re: swallowing or lung status. Pt currently on regular diet, although pureed recommended on 9/10. RN reported no concerns with regular diet. Per hard chart, pt on regular diet at [facility]. On this date, pt with persistent coughing upon entering pt's room.  With first few trials with water by straw and applesauce, pt noted to cough prior to and after the swallow. Once coughing subsided, pt was able to consume 3oz water continuously with no s/s aspiration. Pt with limited dentition. Pt with moderate difficulty with mastication with cracker, stating \"I can't chew that, it's too hard\". Pt with mild-moderately prolonged mastication with sausage and no difficulty with oatmeal or applesauce. Given that pt is COVID +, persistent coughing likely due to COVID and not aspiration. Will con't to assess for need for instrumental assessment. Recommend downgrading diet to dysphagia III/soft and bite sized. Con't goal   9/13- goal met- competed MBS     New goals-  1-The patient will tolerate least restrictive diet without s/s aspiration or respiratory decline. 9/14: Per rn, pt has been coughing frequently. Pt seen bedside, on room air, awake, alert, pleasantly confused, [place]. Positioned upright and trialed nectar via controlled small straw sips and applesauce. Pt with positive oral acceptance, positive swallow movement, good oral clearance. No consistent cough (occurred x1, with and w/o PO). Noted diet currently states puree + nectar; recommend advance to prior recommendation for Minced and Moist as per daughter's preference (phone conversation). Cont goal  9/15-per RN, pt continues with frequent coughing, but pt is COVID + so this may be contributing factor. When performed, all coughing was not a result of aspiration. Pt alert and cooperative. Pt analyzed with trials of nectar thick juice by straw and crushed up felice crackers in pudding. Pt with coughing prior to PO trials, but no immediate coughing or throat clearing noted after any PO trial. Pt required MAX cues to utilize single sips of nectar thick liquid. Mastication with felice crackers in pudding was mildly prolonged. Of note, pt has not made any complaints re: consuming nectar thick liquids.  Recommend upgrading diet to minced and moist but con't with nectar thick liquids. Con't goal      2-The pt/caregiver will demonstrate understanding of swallowing recommendations and concerns. 9/13-   The pt was educated to the result of MBS. Pt indicated comprehension but would benefit from reinforcement. Educated daughter, Arthur Donohue, via phone to results of MBS, concern for aspiration,  diet recommendations (thin vs nectar thick liquids) swallowing strategies (single sips if upgrade to thin) and quality of life considerations given that pt is 80years old. Daughter stated comprehension and decided to try minced and moist diet with nectar thick liquids, but if pt objects to nectar thick liquids then will upgrade to thin liquids by single sips for quality of life given that pt is 80years old. con't goal  9/14: received pt alone, no visitors/family present. Educated to purpose of visit, swallow function, aspiration concerns, yesterday's MBS. No indication of comprehension  Cont goal as appropriate  9/15-  pt with no recall of results of MBS or swallowing strategies. Re-educated pt to results of MBS, rational for nectar thick liquids and importance of utilizing swallowing strategies, with emphasis on single swallows with nectar thick liquids. Pt indicated comprehension and was then able to to take small sips,but question if pt will be abl to recall strategy in the future. Con't goal        Patient/Family/Caregiver Education:  See goal 2 above    Compensatory Strategies:  Eat/Feed slowly, Upright as possible for all oral intake, Remain upright for 30-45 minutes after meals, External pacing, small sips      Plan:  Continue dysphagia treatment with goals per plan of care.   Diet recommendations: Minced & Moist + Nectar (mildly thick) by single, straw sips only, meds in puree  - general aspiration precautions  **oral hygiene x2 daily  DC recommendation:TBD closer to discharge   Treatment: 15  D/W nursing, Josy Perone   Needs met prior to leaving room, call button

## 2022-09-15 NOTE — PROGRESS NOTES
Physical Therapy  Facility/Department: [facility] 4 PCU  Physical Therapy Initial Assessment    Name: Renetta Ely  : 1924  MRN: [id number]  Date of Service: 9/15/2022    Discharge Recommendations: Renetta Ely scored a 6/24 on the AM-PAC short mobility form. At this time, no further PT is recommended upon discharge. Recommend patient returns to prior setting with prior services. PT Equipment Recommendations  Equipment Needed: No      Patient Diagnosis(es): The primary encounter diagnosis was COVID. A diagnosis of Acute on chronic congestive heart failure, unspecified heart failure type [facility]) was also pertinent to this visit. Past Medical History:  has a past medical history of Anxiety, Arthritis, CHF (congestive heart failure) ([address] GERD (gastroesophageal reflux disease), Hyperlipidemia, Hypertension, and Thyroid disease. Past Surgical History:  has no past surgical history on file. Assessment   Assessment: pt is a 81 yo female presenting from nursing home with SOB found to be covid positive. per pt and chart review pt is dependent at baseline and nonambulatory. pt tolerated limited eval due to dependence for all rolling/repositioning and increased pain with movement. pt is hard of hearing requiring inc time to complete tasks due to repeating instructions, pt demos dependence for ADLs as well requiring total A for gown change and feeding during session. pt is not appropriate for acute PT and is at baseline function. pt to be discharged from PT caseload and can return to prior level of services when medically stable.   Therapy Prognosis: Guarded  Decision Making: Medium Complexity  Requires PT Follow-Up: No  Activity Tolerance  Activity Tolerance: Patient tolerated evaluation without incident  Activity Tolerance Comments: limited eval due to pt dependent at baseline     Plan   Plan  Plan: Discharge with evaluation only  Safety Devices  Type of Devices: Left in bed, Bed alarm in place, Call light within reach, Nurse notified     Restrictions  Position Activity Restriction  Other position/activity restrictions: up with assist     Subjective   General  Chart Reviewed: Yes  Patient assessed for rehabilitation services?: Yes  Additional Pertinent Hx: pt is a 81 yo female presenting from nursing home with SOB found to be covid positive. per pt and chart review pt is dependent at baseline and nonambulatory. Referring Practitioner: Lorri Molina MD  Diagnosis: covid  Follows Commands: Within Functional Limits  Subjective  Subjective: pt supine in bed and agreeable to PT, very hard of hearing         Social/Functional History  Social/Functional History  Additional Comments: per CM note \"Pt is from [address] [place] [address] and plans to return at discharge. She is nonambulatory and essentially total care, but they are willing to take her back as this is her baseline\" pt reports using peter to get to  and that she can feed herself but she gets assist for all other care. Vision/Hearing  Vision  Vision: Impaired  Vision Exceptions: Wears glasses for reading  Hearing  Hearing: Exceptions to [facility]  Hearing Exceptions: Hard of hearing/hearing concerns    Cognition   Orientation  Orientation Level: Oriented to person;Disoriented to situation;Disoriented to time (knew we were in a hospital but not the name, knew it was 2020 something but not the month, did not know year of her birthday or why she was here.)  Cognition  Overall Cognitive Status: Exceptions  Following Commands:  Follows one step commands with increased time  Attention Span: Attends with cues to redirect  Memory: Decreased recall of biographical Information;Decreased recall of recent events;Decreased short term memory;Decreased long term memory  Insights: Decreased awareness of deficits  Initiation: Requires cues for all  Sequencing: Requires cues for all  Cognition Comment: dementia at baseline, very hard of hearing and hard to communicate with due to hearing loss

## 2022-09-15 NOTE — DISCHARGE INSTR - COC
Continuity of Care Form    Patient Name: Zeyad Go   :  1924  MRN:  [id number]    Admit date:  2022  Discharge date:9/15/22    Code Status Order: Limited   Advance Directives:     Admitting Physician:  No admitting provider for patient encounter. PCP: Grace Carrington MD    Discharging Nurse: [address] Unit/Room#: 6530/6641-54  Discharging Unit Phone Number:[phone]    Emergency Contact:   Extended Emergency Contact Information  Primary Emergency Contact: Mili Armenta  Address: [address]           [address] Phone: [phone]  Mobile Phone: [phone]  Relation: Child  Preferred language: English   needed? No  Secondary Emergency Contact: [address]  Phone: [phone]  Mobile Phone: [phone]  Relation: Son-in-Law  Preferred language: English   needed? No    Past Surgical History:  History reviewed. No pertinent surgical history. Immunization History: There is no immunization history on file for this patient.     Active Problems:  Patient Active Problem List   Diagnosis Code    Acute combined systolic and diastolic CHF, NYHA class 2 ([facility]) I50.41    Acute COVID-19 U07.1    Elevated BUN R79.9    Elevated serum creatinine R79.89    Normocytic normochromic anemia D64.9    Leukocytosis D72.829       Isolation/Infection:   Isolation            Droplet Plus          Patient Infection Status       Infection Onset Added Last Indicated Last Indicated By Review Planned Expiration Resolved Resolved By    COVID-19 22 COVID-19 & Influenza Combo 22      Resolved    COVID-19 (Rule Out) 22 COVID-19 & Influenza Combo (Ordered)   22 Rule-Out Test Resulted            Nurse Assessment:  Last Vital Signs: BP (!) 153/77   Pulse 66   Temp 98 °F (36.7 °C) (Oral)   Resp 16   Ht 5' 6\" (1.676 m)   Wt 113 lb (51.3 kg)   SpO2 94%   BMI 18.24 kg/m²     Last documented pain score (0-10 scale): Pain Level: 0  Last Weight:   Wt Readings from Last 1 Encounters:   09/15/22 113 lb (51.3 kg)     Mental Status:  Oriented to self able to vocalize commands     IV Access:  - None    Nursing Mobility/ADLs:  Walking   Dependent  Transfer  Dependent  Bathing  Dependent  Dressing  Dependent  Toileting  Dependent  Feeding  Assisted  Med Admin  Dependent  Med Delivery   crushed    Wound Care Documentation and Therapy:        Elimination:  Continence: Bowel: No  Bladder: No  Urinary Catheter: None   Colostomy/Ileostomy/Ileal Conduit: No       Date of Last BM: ***    Intake/Output Summary (Last 24 hours) at 9/15/2022 1316  Last data filed at 9/15/2022 0947  Gross per 24 hour   Intake 350 ml   Output 1100 ml   Net -750 ml     I/O last 3 completed shifts: In: 440 [P.O.:440]  Out: 1250 [Urine:1250]    Safety Concerns:     Aspiration Risk    Impairments/Disabilities:      Hearing    Nutrition Therapy:  Current Nutrition Therapy:   [place] thick     Routes of Feeding: Oral  Liquids: Nectar Thick Liquids  Daily Fluid Restriction: no  Last Modified Barium Swallow with Video (Video Swallowing Test): 9/13/22    Treatments at the Time of Hospital Discharge:   Respiratory Treatments: none  Oxygen Therapy:  is not on home oxygen therapy. Ventilator:    - No ventilator support    Rehab Therapies: Physical Therapy, Occupational Therapy, and Speech/Language Therapy  Weight Bearing Status/Restrictions:    Other Medical Equipment (for information only, NOT a DME order):  hospital bed  Other Treatments: none    Patient's personal belongings (please select all that are sent with patient):  None    RN SIGNATURE:  Electronically signed by Shanell Montes RN on 9/15/22 at 2:13 PM EDT    CASE MANAGEMENT/SOCIAL WORK SECTION    Inpatient Status Date: ***    Readmission Risk Assessment Score:  Readmission Risk              Risk of Unplanned Readmission:  23           Discharging to Facility/ Agency   Name: Ben Denise  Address: [address]  Phone: [phone]        / signature: Electronically signed by Calvin Lino RN on 9/15/22 at 1:21 PM EDT    PHYSICIAN SECTION    Prognosis: Good    Condition at Discharge: Stable    Rehab Potential (if transferring to Rehab): Poor    Recommended Labs or Other Treatments After Discharge: PT/OT, nursing care    Physician Certification: I certify the above information and transfer of Mayra Caballero  is necessary for the continuing treatment of the diagnosis listed and that she requires Assisted Living for greater 30 days.      Update Admission H&P: No change in H&P    PHYSICIAN SIGNATURE:  Electronically signed by Irwin Gongora DO on 9/15/22 at 1:17 PM EDT

## 2022-09-15 NOTE — PROGRESS NOTES
Interval History and plan:      Blood pressure is better   Urine is 1100 ml    Plan:    Creatinine is stable at 1.5> 1.1  She was hypernatremic 148>> 150 > 142now  Continue COVID-19 treatment. Hold lasix   Continue amlodipine  Daily renal panel  UA  Continue IVF  Renal US when stable                    Assessment :     Chronic kidney disease stage III B: Current GFR is 30 mm/min. I do not have any baseline number available. We will try to obtain records from the nursing home and Monday. Echocardiogram showed EF of 60 to 65%. Chest x-ray showed pulmonary edema. Shortness of breath: It is multifactorial including pulmonary edema and COVID-19    BNP was [facility] Nephrology would like to thank Gaby Jeffers DO   for opportunity to serve this patient      Please call with questions at-   24 Hrs Answering service [phone] or  7 am- 5 pm via Perfect serve or cell phone  Santos Nettles MD          CC/reason for consult :     Acute kidney injury     HPI :     Maurizio Hyde is a 80 y.o. female presented to   the hospital on 9/9/2022 with shortness of breath    She has past medical history of congestive heart failure, hypertension, hyperlipidemia, hypothyroidism and osteoarthritis. She was sent from nursing home because of shortness of breath. She is experiencing shortness of breath for 2 to 3 days prior to admission. It was associated with dry cough but no chest pain. There was no fever or chills. When she arrived in the ER she was hypoxic. Chest x-ray did show pulm edema on arrival.    She was tested for COVID-19 infection and came back positive. Also noted was elevated creatinine. Arrival creatinine was 1.8 with a BUN of 60. I do not have any baseline creatinine.     ROS:     Seen with-no family in the room    positives in bold   Hard of hearing                 All other remaining systems are negative or unable to obtain        PMH/PSH/SH/Family History:     Past Medical History: Diagnosis Date    Anxiety     Arthritis     CHF (congestive heart failure) (HCC)     GERD (gastroesophageal reflux disease)     Hyperlipidemia     Hypertension     Thyroid disease        History reviewed. No pertinent surgical history. reports that she does not currently use alcohol. She reports that she does not currently use drugs. family history is not on file.          Medication:     Current Facility-Administered Medications: amLODIPine (NORVASC) tablet 10 mg, 10 mg, Oral, Daily  ampicillin-sulbactam (UNASYN) 3000 mg in 100 mL NS IVPB minibag, 3,000 mg, IntraVENous, Q12H  hydrALAZINE (APRESOLINE) injection 10 mg, 10 mg, IntraVENous, Q6H PRN  dexamethasone (DECADRON) injection 6 mg, 6 mg, IntraVENous, Q24H  ALPRAZolam (XANAX) tablet 0.25 mg, 0.25 mg, Oral, BID  diclofenac sodium (VOLTAREN) 1 % gel 1 g, 1 g, Topical, BID PRN  diphenhydrAMINE (BENADRYL) tablet 25 mg, 25 mg, Oral, Q6H PRN  docusate sodium (COLACE) capsule 100 mg, 100 mg, Oral, BID  ferrous sulfate (IRON 325) tablet 325 mg, 325 mg, Oral, Daily with breakfast  ipratropium-albuterol (DUONEB) nebulizer solution 3 mL, 1 vial, Inhalation, Q4H PRN  levothyroxine (SYNTHROID) tablet 100 mcg, 100 mcg, Oral, Daily  melatonin disintegrating tablet 5 mg, 5 mg, Oral, Nightly  sodium chloride (OCEAN, BABY AYR) 0.65 % nasal spray 1 spray, 1 spray, Nasal, Q8H PRN  traMADol (ULTRAM) tablet 50 mg, 50 mg, Oral, TID  0.9 % sodium chloride bolus, 30 mL, IntraVENous, PRN  sodium chloride flush 0.9 % injection 5-40 mL, 5-40 mL, IntraVENous, 2 times per day  sodium chloride flush 0.9 % injection 5-40 mL, 5-40 mL, IntraVENous, PRN  0.9 % sodium chloride infusion, , IntraVENous, PRN  ondansetron (ZOFRAN-ODT) disintegrating tablet 4 mg, 4 mg, Oral, Q8H PRN **OR** ondansetron (ZOFRAN) injection 4 mg, 4 mg, IntraVENous, Q6H PRN  polyethylene glycol (GLYCOLAX) packet 17 g, 17 g, Oral, Daily PRN  acetaminophen (TYLENOL) tablet 650 mg, 650 mg, Oral, Q6H PRN **OR** acetaminophen (TYLENOL) suppository 650 mg, 650 mg, Rectal, Q6H PRN  guaiFENesin-dextromethorphan (ROBITUSSIN DM) 100-10 MG/5ML syrup 5 mL, 5 mL, Oral, Q4H PRN  benzonatate (TESSALON) capsule 100 mg, 100 mg, Oral, TID PRN  heparin (porcine) injection 5,000 Units, 5,000 Units, SubCUTAneous, 3 times per day       Vitals :     Vitals:    09/15/22 0330   BP: (!) 140/70   Pulse: 55   Resp: 16   Temp: 97 °F (36.1 °C)   SpO2: 95%       I & O :       Intake/Output Summary (Last 24 hours) at 9/15/2022 0926  Last data filed at 9/15/2022 0330  Gross per 24 hour   Intake 340 ml   Output 1100 ml   Net -760 ml          Physical Examination :     General appearance: A appearing, frail and cachectic. HEENT: Lips- normal, teeth- ok , oral mucosa- moist  Neck : Mass- no, appears symmetrical, JVD- not visible  Respiratory: Respiratory effort-  normal, wheeze- no, crackles -   Cardiovascular:  Ausculation- No M/R/G, Edema none  Abdomen: visible mass- no, distention- no, scar- no, tenderness- no                            hepatosplenomegaly-  no  Musculoskeletal: No clubbing or cyanosis  Skin: rashes- no , ulcers- no, induration- no, tightening - no  Psychiatric: Not evaluated  Additional finding:      LABS:     Recent Labs     09/14/22  0525 09/15/22  0510   WBC 14.9* 18.1*   HGB 8.4* 9.3*   HCT 25.7* 28.5*    322       Recent Labs     09/13/22  0710 09/14/22  0525 09/15/22  0510    150* 142   K 3.5 3.4* 4.2    110 106   CO2 22 26 22   BUN 69* 63* 51*   CREATININE 1.5* 1.5* 1.1   GLUCOSE 111* 102* 140*   PHOS 3.5 3.5 2.0*        Nephrology  [address] [facility], [address]  Office: [phone]  Fax: [phone]

## 2022-09-15 NOTE — PLAN OF CARE
Problem: Skin/Tissue Integrity  Goal: Absence of new skin breakdown  Description: 1. Monitor for areas of redness and/or skin breakdown  2. Assess vascular access sites hourly  3. Every 4-6 hours minimum:  Change oxygen saturation probe site  4. Every 4-6 hours:  If on nasal continuous positive airway pressure, respiratory therapy assess nares and determine need for appliance change or resting period. 9/14/2022 2237 by Oliver Zavala RN  Outcome: Progressing   Patient is getting Q2 turns to prevent break down. Problem: Respiratory - Adult  Goal: Achieves optimal ventilation and oxygenation  9/14/2022 2237 by Oliver Zavala RN  Outcome: Progressing   Patient is on room air and sating at 94% on room air. Problem: Safety - Adult  Goal: Free from fall injury  9/14/2022 2237 by Oliver Zavala RN  Outcome: Progressing   Pt is a Fall Risk. See Junaid Fay Fall Risk Score. Pt bed in low position and side rails up. Call light and belongings in reach. Pt encouraged to call for assistance. Will continue with hourly rounds for PO intake, pain needs, toileting, and repositioning as needed.

## 2022-09-15 NOTE — PROGRESS NOTES
Report called to Gayle at [facility].  Electronically signed by Ernesto Alicea RN on 9/15/2022 at 3:46 PM

## 2022-09-15 NOTE — DISCHARGE SUMMARY
Hospital Medicine Discharge Summary    Patient ID: Donetta Koyanagi      Patient's PCP: Ora Torres MD    Admit Date: 9/9/2022     Discharge Date: 9/15/2022      Admitting Provider: No admitting provider for patient encounter. Discharge Provider: Chito Grigsby DO     Discharge Diagnoses: Active Hospital Problems    Diagnosis     Acute COVID-19 [U07.1]      Priority: Medium    Elevated BUN [R79.9]      Priority: Medium    Elevated serum creatinine [R79.89]      Priority: Medium    Normocytic normochromic anemia [D64.9]      Priority: Medium    Leukocytosis [D72.829]      Priority: Medium       The patient was seen and examined on day of discharge and this discharge summary is in conjunction with any daily progress note from day of discharge. Hospital Course: Donetta Koyanagi is a 80 y.o. female with a past medical history of hypertension, hypothyroidism, and congestive heart failure, nursing home resident, presented to the [facility] emergency department by EMS from nursing home for evaluation of shortness of breath. Reportedly patient has been experiencing difficulty breathing and shortness of breath about 2 to 3 days prior to admission, persistent, associated with cough, persistent cough, dry, no associated pain. Upon presentation she was noted to be hypoxic in the 80s to 90s on room air. Chest x-ray on presentation showed evidence of pulmonary edema, as such her hypoxemia was attributed to fluid overload. Other positive findings on presentation, positive for COVID-19 infection, elevated creatinine and BUN, bilirubinemia and leukocytosis. Admitted for further evaluation and management. Patient is very hard of hearing, unable to provide history. Acute hypoxic respiratory failure  Secondary to COVID-19. Per cardiology, patient does not have acute CHF.   We will continue oxygen supplementation as needed  Continue Unasyn for aspiration PNA, can likely be discharged gatito  course of Augmentin  D-dimer was high on admit, suspected 2/2 Covid. Will f/u on LE doppler for DVTs (cannot perform CTA or VQ)  - off oxygen now  - can discharge with a course of augmentin     Acute COVID-19 infection  Tested positive 6 days ago. Continue remdesivir, examethasone.  - medro dose pack ok to discharge     Suspected chronic kidney disease  Creatinine was 1.8 on admit,  Given patient's age, gives the impression of advanced CKD with a possibly acute component. Continue to monitor. Creatinine back to 1.1     Anemia  Gives the impression of being chronic, with no acute blood loss. Monitor and transfuse if indicated. Hypothyroidism  Continue same dose of Synthroid as before     Elevated troponin  Consistent with demand ischemia. Not a candidate for ischemic evaluation even if patient turns out to have true ACS. DVT Prophylaxis: Continue subcutaneous heparin  Diet: ADULT DIET; Dysphagia - Pureed; Mildly Thick (Nectar)  Code Status: Discussed with daughter today who confirmed pt has living will that states she is a DNR and agrees with what her mother wants. Will change CODE STATUS to DNR/DNI  PT/OT Eval Status: Cannot tolerate today. Physical Exam Performed:     BP (!) 153/77   Pulse 66   Temp 98 °F (36.7 °C) (Oral)   Resp 16   Ht 5' 6\" (1.676 m)   Wt 113 lb (51.3 kg)   SpO2 94%   BMI 18.24 kg/m²       General appearance:  chronically ill appearing, appears stated age and cooperative. HEENT:  Normal cephalic, atraumatic without obvious deformity. Pupils equal, round, and reactive to light. Extra ocular muscles intact. Conjunctivae/corneas clear. Neck: Supple, with full range of motion. No jugular venous distention. Trachea midline. Respiratory:  Normal respiratory effort. Clear to auscultation, bilaterally without Rales/Wheezes/Rhonchi. Cardiovascular:  Regular rate and rhythm with normal S1/S2 without murmurs, rubs or gallops.   Abdomen: Soft, non-tender, non-distended with normal bowel sounds. Musculoskeletal:  No clubbing, cyanosis or edema bilaterally. Full range of motion without deformity. Skin: Skin color, texture, turgor normal.  No rashes or lesions. Neurologic:  Neurovascularly intact without any focal sensory/motor deficits. Cranial nerves: II-XII intact, grossly non-focal.  Psychiatric:  Alert and oriented, thought content appropriate, normal insight  Capillary Refill: Brisk,< 3 seconds   Peripheral Pulses: +2 palpable, equal bilaterally       Labs: For convenience and continuity at follow-up the following most recent labs are provided:      CBC:    Lab Results   Component Value Date/Time    WBC 18.1 09/15/2022 05:10 AM    HGB 9.3 09/15/2022 05:10 AM    HCT 28.5 09/15/2022 05:10 AM     09/15/2022 05:10 AM       Renal:    Lab Results   Component Value Date/Time     09/15/2022 05:10 AM    K 4.2 09/15/2022 05:10 AM    K 3.7 09/10/2022 05:23 AM     09/15/2022 05:10 AM    CO2 22 09/15/2022 05:10 AM    BUN 51 09/15/2022 05:10 AM    CREATININE 1.1 09/15/2022 05:10 AM    CALCIUM 7.6 09/15/2022 05:10 AM    PHOS 2.0 09/15/2022 05:10 AM         Significant Diagnostic Studies    Radiology:   XR CHEST PORTABLE   Final Result   Impression: Multifocal pulmonary consolidative opacities, left greater than right, slightly increased in the interval.      FL MODIFIED BARIUM SWALLOW W VIDEO   Final Result   1. Deep penetration with thin liquids and with nectar consistencies. No aspiration visualized. 2. Oral dysphagia. VL Extremity Venous Bilateral   Final Result      US RENAL COMPLETE   Final Result   1. Small atrophic appearing kidneys with no hydronephrosis. XR CHEST PORTABLE   Final Result   1. Pulmonary edema. 2.  Small left pleural effusion. 3.  Age-indeterminate fractures of left ribs 8 and 9. In correlation with point tenderness.              Consults:     IP CONSULT TO HOSPITALIST  IP CONSULT TO CARDIOLOGY  IP CONSULT TO PHARMACY  IP CONSULT TO NEPHROLOGY    Disposition:  assisted living     Condition at Discharge: Stable    Discharge Instructions/Follow-up:  PCP    Code Status:  DNR-CCA     Activity: activity as tolerated    Diet: regular diet      Discharge Medications:     Discharge Medication List as of 9/15/2022  2:14 PM             Details   methylPREDNISolone (MEDROL, CAROLINE,) 4 MG tablet Take by mouth., Disp-1 kit, R-0Normal      benzonatate (TESSALON) 100 MG capsule Take 1 capsule by mouth 3 times daily as needed for Cough, Disp-21 capsule, R-0Normal      guaiFENesin-dextromethorphan (ROBITUSSIN DM) 100-10 MG/5ML syrup Take 5 mLs by mouth every 4 hours as needed for Cough, Disp-120 mL, R-0Normal      amoxicillin-clavulanate (AUGMENTIN) 875-125 MG per tablet Take 1 tablet by mouth 2 times daily for 5 days, Disp-10 tablet, R-0Normal                Details   acetaminophen (TYLENOL) 325 MG tablet Take 650 mg by mouth in the morning and at bedtimeHistorical Med      ALPRAZolam (XANAX) 0.25 MG tablet Take 0.25 mg by mouth in the morning and at bedtime. Historical Med      Menthol-Zinc Oxide (CALMOSEPTINE EX) Apply topically in the morning and at bedtime For francisco-careHistorical Med      diphenhydrAMINE (BENADRYL) 25 MG tablet Take 25 mg by mouth every 6 hours as needed for ItchingHistorical Med      docusate sodium (COLACE) 100 MG capsule Take 100 mg by mouth 2 times dailyHistorical Med      ferrous sulfate (IRON 325) 325 (65 Fe) MG tablet Take 325 mg by mouth daily (with breakfast)Historical Med      furosemide (LASIX) 20 MG tablet Take 20 mg by mouth dailyHistorical Med      hydrocortisone 2.5 % cream Apply topically every 6 hours as needed, Topical, EVERY 6 HOURS PRN, Historical Med      ipratropium-albuterol (DUONEB) 0.5-2.5 (3) MG/3ML SOLN nebulizer solution Inhale 1 vial into the lungs every 4 hours as needed for Shortness of BreathHistorical Med      levothyroxine (SYNTHROID) 100 MCG tablet Take 100 mcg by mouth DailyHistorical Med      loperamide (IMODIUM) 2 MG capsule Take 2 mg by mouth every 4 hours as needed for Diarrhea Max of 16 mg/dayHistorical Med      melatonin 5 MG TABS tablet Take 5 mg by mouth nightlyHistorical Med      NIFEdipine (ADALAT CC) 30 MG extended release tablet Take 30 mg by mouth dailyHistorical Med      ondansetron (ZOFRAN) 4 MG tablet Take 4 mg by mouth every 8 hours as needed for Nausea or VomitingHistorical Med      Multiple Vitamin (MULTIVITAMIN ADULT PO) Take 1 tablet by mouth dailyHistorical Med      sodium chloride (OCEAN) 0.65 % nasal spray 1 spray by Nasal route every 8 hours as needed for Congestion Each nostrilHistorical Med      traMADol (ULTRAM) 50 MG tablet Take 50 mg by mouth every 8 hours as needed for Pain. Historical Med      diclofenac sodium (VOLTAREN) 1 % GEL Apply 1 g topically 2 times daily as needed for Pain (right shoulder, elbow, and wrist), Topical, 2 TIMES DAILY PRN, Historical Med             Time Spent on discharge is more than 45 minutes in the examination, evaluation, counseling and review of medications and discharge plan. Signed: Jade Jain DO   9/15/2022      Thank you Natividad Martinez MD for the opportunity to be involved in this patient's care. If you have any questions or concerns, please feel free to contact me at [phone].

## 2022-09-15 NOTE — CARE COORDINATION
Case Management Assessment            Discharge Note                    Date / Time of Note: 9/15/2022 1:01 PM                  Discharge Note Completed by: Roosevelt Ludwig RN    Patient Name: Bob Dennis   YOB: 1924  Diagnosis: Acute combined systolic and diastolic CHF, [facility] [I50.41]  Acute on chronic congestive heart failure, unspecified heart failure type ([facility] [I50.9]  COVID [U07.1]   Date / Time: 9/9/2022  8:52 AM    Current PCP: Rosie Gu MD  Clinic patient: No    Hospitalization in the last 30 days: No    Advance Directives:  Code Status: Limited  [place] DNR form completed and on chart: Yes    Financial:  Payor: Sonia Morgan / Plan: Melia Wade / Product Type: *No Product type* /      Pharmacy:    [facility] - F [phone]  [address] [place] [address]  Phone: [phone] Fax: [phone]      Assistance purchasing medications?: Potential Assistance Purchasing Medications: No  Assistance provided by Case Management: None at this time    Does patient want to participate in local refill/ meds to beds program?:      Meds To Beds General Rules:  1. Can ONLY be done Monday- Friday between 8:30am-5pm  2. Prescription(s) must be in pharmacy by 3pm to be filled same day  3. Copy of patient's insurance/ prescription drug card and patient face sheet must be sent along with the prescription(s)  4. Cost of Rx cannot be added to hospital bill. If financial assistance is needed, please contact unit  or ;  or  CANNOT provide pharmacy voucher for patients co-pays  5.  Patients can then  the prescription on their way out of the hospital at discharge, or pharmacy can deliver to the bedside if staff is available. (payment due at time of pick-up or delivery - cash, check, or card accepted)     Able to afford home medications/ co-pay costs: Yes    ADLS:  Current PT AM-PAC Score: 6 /24  Current OT AM-PAC Score: 9 /24      DISCHARGE Disposition: Assisted Living Facility: [facility]  Phone: [phone] Fax: [phone]    LOC at discharge: Not Applicable  GUNNAR Completed: Yes    Notification completed in HENS/PAS?:  Not Applicable    IMM Completed:   Yes, Case management has presented and reviewed IMM letter #2 to the patient and/or family/ POA. Patient and/or family/POA verbalized understanding of their medicare rights and appeal process if needed. Patient and/or family/POA has signed, initialed and placed today's date (9/15/22) and time (1430) on IMM letter #2 on the the appropriate lines. Patient and/or family/POA, copy of letter offered and they are aware that this original copy of IMM letter #2 is available prior to discharge from the paper chart on the unit. Electronic documentation has been entered into epic for IMM letter #2 and original paper copy has been added to the paper chart at the nurses station.      Transportation:  Transportation PLAN for discharge: EMS transportation   Mode of Transport: Ambulance stretcher - BLS  Reason for medical transport: Bed confined: Meets the following criteria 1) unable to get out of bed without assistance or ambulate, 2) unable to safely sit up in a wheelchair, 3) unable to maintain erect seating position in a chair for time needed for transport  Name of [employer]: [facility]  Phone: [phone]  Time of Transport: 1430    Transport form completed: Yes    Home Care:  [address] ordered at discharge: Not [address]: Not Applicable  Orders faxed: No    Durable Medical Equipment:  DME Provider: n/a  Equipment obtained during hospitalization: defer    Home Oxygen and Respiratory Equipment:  Oxygen needed at discharge?: Not [address]: Not Applicable  Portable tank available for discharge?: Not Indicated    Dialysis:  Dialysis patient: No    Dialysis Center:  Not Applicable    Hospice Services:  Location: Not Applicable  Agency: Not Applicable    Consents signed: Not Indicated    Referrals made at [facility] for outpatient continued care:  Not Applicable    Additional CM Notes: Patient will return to [facility]. Orders faxed to [phone], nurse to call report to [phone]. Patient scheduled for transport at [phone] via [address] Patient's daughter aware and agreeable to plan. The Plan for Transition of Care is related to the following treatment goals of Acute combined systolic and diastolic CHF, NYHA class 2 (HCC) [I50.41]  Acute on chronic congestive heart failure, unspecified heart failure type ([address] [I50.9]  COVID [U07.1]    The Patient and/or patient representative Parris Becerra and her family were provided with a choice of provider and agrees with the discharge plan Yes    Freedom of choice list was provided with basic dialogue that supports the patient's individualized plan of care/goals and shares the quality data associated with the providers.  Yes    Care Transitions patient: No    Savi Barton RN  [facility]  Case Management Department  Ph: [phone]  Fax: [phone]

## 2023-01-01 ENCOUNTER — APPOINTMENT (OUTPATIENT)
Dept: GENERAL RADIOLOGY | Age: 88
DRG: 871 | End: 2023-01-01
Payer: COMMERCIAL

## 2023-01-01 ENCOUNTER — APPOINTMENT (OUTPATIENT)
Dept: CT IMAGING | Age: 88
DRG: 871 | End: 2023-01-01
Payer: COMMERCIAL

## 2023-01-01 ENCOUNTER — HOSPITAL ENCOUNTER (INPATIENT)
Age: 88
LOS: 1 days | DRG: 871 | End: 2023-03-24
Attending: STUDENT IN AN ORGANIZED HEALTH CARE EDUCATION/TRAINING PROGRAM | Admitting: INTERNAL MEDICINE
Payer: COMMERCIAL

## 2023-01-01 VITALS
OXYGEN SATURATION: 90 % | SYSTOLIC BLOOD PRESSURE: 63 MMHG | RESPIRATION RATE: 31 BRPM | HEART RATE: 80 BPM | DIASTOLIC BLOOD PRESSURE: 42 MMHG | BODY MASS INDEX: 16.62 KG/M2 | TEMPERATURE: 96.6 F | WEIGHT: 103 LBS

## 2023-01-01 DIAGNOSIS — A41.9 SEPTIC SHOCK (HCC): Primary | ICD-10-CM

## 2023-01-01 DIAGNOSIS — R65.21 SEPTIC SHOCK (HCC): Primary | ICD-10-CM

## 2023-01-01 LAB
ALBUMIN SERPL-MCNC: 3.6 G/DL (ref 3.4–5)
ALBUMIN/GLOB SERPL: 1 {RATIO} (ref 1.1–2.2)
ALP SERPL-CCNC: 257 U/L (ref 40–129)
ALT SERPL-CCNC: 84 U/L (ref 10–40)
ANION GAP SERPL CALCULATED.3IONS-SCNC: 26 MMOL/L (ref 3–16)
ANISOCYTOSIS BLD QL SMEAR: ABNORMAL
AST SERPL-CCNC: 163 U/L (ref 15–37)
BASE EXCESS BLDV CALC-SCNC: -8.9 MMOL/L (ref -2–3)
BASOPHILS # BLD: 0 K/UL (ref 0–0.2)
BASOPHILS NFR BLD: 0 %
BILIRUB SERPL-MCNC: 1.2 MG/DL (ref 0–1)
BUN SERPL-MCNC: 54 MG/DL (ref 7–20)
CALCIUM SERPL-MCNC: 9.9 MG/DL (ref 8.3–10.6)
CHLORIDE SERPL-SCNC: 100 MMOL/L (ref 99–110)
CO2 BLDV-SCNC: 19 MMOL/L
CO2 SERPL-SCNC: 14 MMOL/L (ref 21–32)
COHGB MFR BLDV: 1.2 % (ref 0–1.5)
CREAT SERPL-MCNC: 1.8 MG/DL (ref 0.6–1.2)
DEPRECATED RDW RBC AUTO: 17 % (ref 12.4–15.4)
DO-HGB MFR BLDV: 83 %
EKG ATRIAL RATE: 88 BPM
EKG DIAGNOSIS: NORMAL
EKG P AXIS: 65 DEGREES
EKG P-R INTERVAL: 186 MS
EKG Q-T INTERVAL: 374 MS
EKG QRS DURATION: 106 MS
EKG QTC CALCULATION (BAZETT): 452 MS
EKG R AXIS: -67 DEGREES
EKG T AXIS: 73 DEGREES
EKG VENTRICULAR RATE: 88 BPM
EOSINOPHIL # BLD: 0 K/UL (ref 0–0.6)
EOSINOPHIL NFR BLD: 0 %
GFR SERPLBLD CREATININE-BSD FMLA CKD-EPI: 25 ML/MIN/{1.73_M2}
GLUCOSE SERPL-MCNC: 139 MG/DL (ref 70–99)
HCO3 BLDV-SCNC: 18.1 MMOL/L (ref 24–28)
HCT VFR BLD AUTO: 34.3 % (ref 36–48)
HGB BLD-MCNC: 11 G/DL (ref 12–16)
LACTATE BLDV-SCNC: 4.4 MMOL/L (ref 0.4–1.9)
LACTATE BLDV-SCNC: 6.8 MMOL/L (ref 0.4–2)
LACTATE BLDV-SCNC: 7.3 MMOL/L (ref 0.4–1.9)
LYMPHOCYTES # BLD: 2.5 K/UL (ref 1–5.1)
LYMPHOCYTES NFR BLD: 7 %
MACROCYTES BLD QL SMEAR: ABNORMAL
MCH RBC QN AUTO: 32.5 PG (ref 26–34)
MCHC RBC AUTO-ENTMCNC: 32.2 G/DL (ref 31–36)
MCV RBC AUTO: 100.8 FL (ref 80–100)
METAMYELOCYTES NFR BLD MANUAL: 4 %
METHGB MFR BLDV: 0.6 % (ref 0–1.5)
MICROCYTES BLD QL SMEAR: ABNORMAL
MONOCYTES # BLD: 4 K/UL (ref 0–1.3)
MONOCYTES NFR BLD: 11 %
MYELOCYTES NFR BLD MANUAL: 1 %
NEUTROPHILS # BLD: 29.6 K/UL (ref 1.7–7.7)
NEUTROPHILS NFR BLD: 65 %
NEUTS BAND NFR BLD MANUAL: 12 % (ref 0–7)
NT-PROBNP SERPL-MCNC: ABNORMAL PG/ML (ref 0–449)
OVALOCYTES BLD QL SMEAR: ABNORMAL
PATH INTERP BLD-IMP: NORMAL
PCO2 BLDV: 42.4 MMHG (ref 41–51)
PH BLDV: 7.24 [PH] (ref 7.35–7.45)
PLATELET # BLD AUTO: 377 K/UL (ref 135–450)
PLATELET BLD QL SMEAR: ADEQUATE
PMV BLD AUTO: 8.8 FL (ref 5–10.5)
PO2 BLDV: <30 MMHG (ref 25–40)
POLYCHROMASIA BLD QL SMEAR: ABNORMAL
POTASSIUM SERPL-SCNC: 4.6 MMOL/L (ref 3.5–5.1)
POTASSIUM SERPL-SCNC: 6.7 MMOL/L (ref 3.5–5.1)
PROT SERPL-MCNC: 7.2 G/DL (ref 6.4–8.2)
RBC # BLD AUTO: 3.4 M/UL (ref 4–5.2)
SAO2 % BLDV: 16 %
SLIDE REVIEW: ABNORMAL
SODIUM SERPL-SCNC: 140 MMOL/L (ref 136–145)
TROPONIN T SERPL-MCNC: 0.02 NG/ML
TROPONIN T SERPL-MCNC: 0.04 NG/ML
WBC # BLD AUTO: 36.1 K/UL (ref 4–11)

## 2023-01-01 PROCEDURE — 84484 ASSAY OF TROPONIN QUANT: CPT

## 2023-01-01 PROCEDURE — 87040 BLOOD CULTURE FOR BACTERIA: CPT

## 2023-01-01 PROCEDURE — 96374 THER/PROPH/DIAG INJ IV PUSH: CPT

## 2023-01-01 PROCEDURE — 99285 EMERGENCY DEPT VISIT HI MDM: CPT

## 2023-01-01 PROCEDURE — 70450 CT HEAD/BRAIN W/O DYE: CPT

## 2023-01-01 PROCEDURE — 83880 ASSAY OF NATRIURETIC PEPTIDE: CPT

## 2023-01-01 PROCEDURE — 6360000002 HC RX W HCPCS: Performed by: STUDENT IN AN ORGANIZED HEALTH CARE EDUCATION/TRAINING PROGRAM

## 2023-01-01 PROCEDURE — 1200000000 HC SEMI PRIVATE

## 2023-01-01 PROCEDURE — 82803 BLOOD GASES ANY COMBINATION: CPT

## 2023-01-01 PROCEDURE — 84132 ASSAY OF SERUM POTASSIUM: CPT

## 2023-01-01 PROCEDURE — 96365 THER/PROPH/DIAG IV INF INIT: CPT

## 2023-01-01 PROCEDURE — 2580000003 HC RX 258: Performed by: STUDENT IN AN ORGANIZED HEALTH CARE EDUCATION/TRAINING PROGRAM

## 2023-01-01 PROCEDURE — 36415 COLL VENOUS BLD VENIPUNCTURE: CPT

## 2023-01-01 PROCEDURE — 80053 COMPREHEN METABOLIC PANEL: CPT

## 2023-01-01 PROCEDURE — 85025 COMPLETE CBC W/AUTO DIFF WBC: CPT

## 2023-01-01 PROCEDURE — 83605 ASSAY OF LACTIC ACID: CPT

## 2023-01-01 PROCEDURE — 96366 THER/PROPH/DIAG IV INF ADDON: CPT

## 2023-01-01 PROCEDURE — 71045 X-RAY EXAM CHEST 1 VIEW: CPT

## 2023-01-01 PROCEDURE — 71250 CT THORAX DX C-: CPT

## 2023-01-01 PROCEDURE — 93005 ELECTROCARDIOGRAM TRACING: CPT | Performed by: STUDENT IN AN ORGANIZED HEALTH CARE EDUCATION/TRAINING PROGRAM

## 2023-01-01 PROCEDURE — 2500000003 HC RX 250 WO HCPCS: Performed by: STUDENT IN AN ORGANIZED HEALTH CARE EDUCATION/TRAINING PROGRAM

## 2023-01-01 RX ORDER — ACETAMINOPHEN 325 MG/1
650 TABLET ORAL EVERY 6 HOURS PRN
Status: CANCELLED | OUTPATIENT
Start: 2023-01-01

## 2023-01-01 RX ORDER — SODIUM CHLORIDE, SODIUM LACTATE, POTASSIUM CHLORIDE, AND CALCIUM CHLORIDE .6; .31; .03; .02 G/100ML; G/100ML; G/100ML; G/100ML
1000 INJECTION, SOLUTION INTRAVENOUS ONCE
Status: COMPLETED | OUTPATIENT
Start: 2023-01-01 | End: 2023-01-01

## 2023-01-01 RX ORDER — SODIUM CHLORIDE 0.9 % (FLUSH) 0.9 %
5-40 SYRINGE (ML) INJECTION PRN
Status: CANCELLED | OUTPATIENT
Start: 2023-01-01

## 2023-01-01 RX ORDER — SODIUM CHLORIDE 0.9 % (FLUSH) 0.9 %
5-40 SYRINGE (ML) INJECTION EVERY 12 HOURS SCHEDULED
Status: CANCELLED | OUTPATIENT
Start: 2023-01-01

## 2023-01-01 RX ORDER — ONDANSETRON 4 MG/1
4 TABLET, ORALLY DISINTEGRATING ORAL EVERY 8 HOURS PRN
Status: CANCELLED | OUTPATIENT
Start: 2023-01-01

## 2023-01-01 RX ORDER — POLYETHYLENE GLYCOL 3350 17 G/17G
17 POWDER, FOR SOLUTION ORAL DAILY PRN
Status: CANCELLED | OUTPATIENT
Start: 2023-01-01

## 2023-01-01 RX ORDER — METRONIDAZOLE 500 MG/100ML
500 INJECTION, SOLUTION INTRAVENOUS ONCE
Status: COMPLETED | OUTPATIENT
Start: 2023-01-01 | End: 2023-01-01

## 2023-01-01 RX ORDER — LORAZEPAM 2 MG/ML
1 INJECTION INTRAMUSCULAR EVERY 6 HOURS PRN
Status: DISCONTINUED | OUTPATIENT
Start: 2023-01-01 | End: 2023-01-01 | Stop reason: HOSPADM

## 2023-01-01 RX ORDER — LORAZEPAM 2 MG/ML
1 INJECTION INTRAMUSCULAR EVERY 6 HOURS PRN
Status: CANCELLED | OUTPATIENT
Start: 2023-01-01

## 2023-01-01 RX ORDER — SODIUM CHLORIDE 9 MG/ML
INJECTION, SOLUTION INTRAVENOUS PRN
Status: CANCELLED | OUTPATIENT
Start: 2023-01-01

## 2023-01-01 RX ORDER — SODIUM CHLORIDE 9 MG/ML
INJECTION, SOLUTION INTRAVENOUS CONTINUOUS
Status: CANCELLED | OUTPATIENT
Start: 2023-01-01

## 2023-01-01 RX ORDER — GLYCOPYRROLATE 0.2 MG/ML
0.1 INJECTION INTRAMUSCULAR; INTRAVENOUS EVERY 4 HOURS PRN
Status: DISCONTINUED | OUTPATIENT
Start: 2023-01-01 | End: 2023-01-01 | Stop reason: HOSPADM

## 2023-01-01 RX ORDER — ACETAMINOPHEN 650 MG/1
650 SUPPOSITORY RECTAL EVERY 6 HOURS PRN
Status: CANCELLED | OUTPATIENT
Start: 2023-01-01

## 2023-01-01 RX ORDER — ONDANSETRON 2 MG/ML
4 INJECTION INTRAMUSCULAR; INTRAVENOUS EVERY 6 HOURS PRN
Status: CANCELLED | OUTPATIENT
Start: 2023-01-01

## 2023-01-01 RX ORDER — METRONIDAZOLE 500 MG/100ML
500 INJECTION, SOLUTION INTRAVENOUS EVERY 8 HOURS
Status: CANCELLED | OUTPATIENT
Start: 2023-01-01

## 2023-01-01 RX ORDER — MORPHINE SULFATE 2 MG/ML
1 INJECTION, SOLUTION INTRAMUSCULAR; INTRAVENOUS
Status: CANCELLED | OUTPATIENT
Start: 2023-01-01

## 2023-01-01 RX ADMIN — SODIUM CHLORIDE, POTASSIUM CHLORIDE, SODIUM LACTATE AND CALCIUM CHLORIDE 1000 ML: 600; 310; 30; 20 INJECTION, SOLUTION INTRAVENOUS at 08:28

## 2023-01-01 RX ADMIN — METRONIDAZOLE 500 MG: 500 INJECTION, SOLUTION INTRAVENOUS at 14:08

## 2023-01-01 RX ADMIN — CEFEPIME 1000 MG: 1 INJECTION, POWDER, FOR SOLUTION INTRAMUSCULAR; INTRAVENOUS at 10:30

## 2023-01-01 ASSESSMENT — PAIN - FUNCTIONAL ASSESSMENT: PAIN_FUNCTIONAL_ASSESSMENT: NONE - DENIES PAIN

## 2023-03-24 PROBLEM — A41.9 SEPTIC SHOCK (HCC): Status: ACTIVE | Noted: 2023-01-01

## 2023-03-24 PROBLEM — R65.21 SEPTIC SHOCK (HCC): Status: ACTIVE | Noted: 2023-01-01

## 2023-03-24 NOTE — ED PROVIDER NOTES
At 1750, after this patient had been admitted to the inpatient service for a little over 2 hours, under hospice care for end-of-life comfort care, I was asked by nursing staff to confirm that the patient is now . By report, she had been without signs of life for a while, but the inpatient team was not able to come to the emergency department to perform an exam and pronounce time of death, and ultimately requested an emergency physician to perform this examination. I did perform an examination of the patient. Her pupils are mid dilated and fixed. She has no corneal reflex or gag reflex. She has no palpable central pulses or audible heart tones. Over several minutes of observation, she has no respiratory effort. Time of death was pronounced at [address] [facility].      Alisia Hilton MD  23 0501

## 2023-03-24 NOTE — ED PROVIDER NOTES
daughter, I ordered an additional fluid bolus but we agreed to defer further escalation of care. Medical Decision Making  Amount and/or Complexity of Data Reviewed  Independent Historian:      Details: Daughter  Labs: ordered. Radiology: ordered. ECG/medicine tests: ordered. Risk  Prescription drug management. Decision regarding hospitalization. Decision not to resuscitate or to de-escalate care because of poor prognosis. Clinical Impression     1. Septic shock ([address]        Disposition     PATIENT REFERRED TO:  No follow-up provider specified. DISCHARGE MEDICATIONS:  New Prescriptions    No medications on file       DISPOSITION Admitted 03/24/2023 03:02:36 PM        Diagnostic Results and Other Data       RADIOLOGY:  CT CHEST ABDOMEN PELVIS WO CONTRAST Additional Contrast? None   Final Result      CHEST:      1. Multiple subcentimeter pulmonary nodules throughout the right lung and bilateral reticular opacities, possibly secondary to infectious/inflammatory process. 2. Patulous, fluid-filled esophagus. ABDOMEN/PELVIS:      1. Diffuse wall thickening of the colon consistent with colitis. 2. Bilateral inguinal hernias, the right containing small bowel. No findings to suggest incarceration. 3. Nonspecific nodular thickening of the right adrenal gland. CT HEAD WO CONTRAST   Final Result   1. No acute intracranial abnormality. XR CHEST PORTABLE   Final Result      Diffuse subpleural predominant interstitial prominence is compatible with fibrosis. No focal consolidation. Normal cardiomediastinal silhouette. Scalloped appearance of the proximal humeri again noted.           LABS:   Results for orders placed or performed during the hospital encounter of 03/24/23   CBC with Auto Differential   Result Value Ref Range    WBC 36.1 (H) 4.0 - 11.0 K/uL    RBC 3.40 (L) 4.00 - 5.20 M/uL    Hemoglobin 11.0 (L) 12.0 - 16.0 g/dL    Hematocrit 34.3 (L) 36.0 - 48.0 %    MCV 100.8 (H) 80.0 - 100.0 fL    MCH 32.5 26.0 - 34.0 pg    MCHC 32.2 31.0 - 36.0 g/dL    RDW 17.0 (H) 12.4 - 15.4 %    Platelets 447 248 - 803 K/uL    MPV 8.8 5.0 - 10.5 fL    PLATELET SLIDE REVIEW Adequate     SLIDE REVIEW see below     Neutrophils % 65.0 %    Lymphocytes % 7.0 %    Monocytes % 11.0 %    Eosinophils % 0.0 %    Basophils % 0.0 %    Neutrophils Absolute 29.6 (H) 1.7 - 7.7 K/uL    Lymphocytes Absolute 2.5 1.0 - 5.1 K/uL    Monocytes Absolute 4.0 (H) 0.0 - 1.3 K/uL    Eosinophils Absolute 0.0 0.0 - 0.6 K/uL    Basophils Absolute 0.0 0.0 - 0.2 K/uL    Bands Relative 12 (H) 0 - 7 %    Metamyelocytes Relative 4 (A) %    Myelocyte Percent 1 (A) %    Anisocytosis Occasional (A)     Macrocytes 1+ (A)     Microcytes Occasional (A)     Polychromasia Occasional (A)     Ovalocytes Occasional (A)    CMP w/ Reflex to MG   Result Value Ref Range    Sodium 140 136 - 145 mmol/L    Potassium reflex Magnesium 6.7 (HH) 3.5 - 5.1 mmol/L    Chloride 100 99 - 110 mmol/L    CO2 14 (LL) 21 - 32 mmol/L    Anion Gap 26 (H) 3 - 16    Glucose 139 (H) 70 - 99 mg/dL    BUN 54 (H) 7 - 20 mg/dL    Creatinine 1.8 (H) 0.6 - 1.2 mg/dL    Est, Glom Filt Rate 25 (A) >60    Calcium 9.9 8.3 - 10.6 mg/dL    Total Protein 7.2 6.4 - 8.2 g/dL    Albumin 3.6 3.4 - 5.0 g/dL    Albumin/Globulin Ratio 1.0 (L) 1.1 - 2.2    Total Bilirubin 1.2 (H) 0.0 - 1.0 mg/dL    Alkaline Phosphatase 257 (H) 40 - 129 U/L    ALT 84 (H) 10 - 40 U/L     (H) 15 - 37 U/L   Troponin   Result Value Ref Range    Troponin 0.04 (H) <0.01 ng/mL   BNP   Result Value Ref Range    Pro-BNP 12,312 (H) 0 - 449 pg/mL   Lactic Acid   Result Value Ref Range    Lactic Acid 6.8 (HH) 0.4 - 2.0 mmol/L   Blood Gas, Venous   Result Value Ref Range    pH, [place] 7.238 (L) 7.350 - 7.450    pCO2, [place] 42.4 41.0 - 51.0 mmHg    pO2, [place] <30.0 25.0 - 40.0 mmHg    HCO3, Venous 18.1 (L) 24.0 - 28.0 mmol/L    Base Excess, [place] -8.9 (L) -2.0 - 3.0 mmol/L    O2 Sat, [place] 16 Not established %

## 2023-03-24 NOTE — CARE COORDINATION
CM received a call from the palliative care nurse practitioner, Vinnie Castaneda. She is working with Ms. Alston's family. They have requested a referral to [facility]. A call was placed to Radha [phone]. Spoke with [facility] in admissions. She will contact the pt's daughter, Lexi Watters to set up a family meeting today.     Real Ferrera RN  Case Management  [phone]

## 2023-03-24 NOTE — H&P
Hospital Medicine History & Physical      PCP: Elbert Chavez MD    Date of Admission: 3/24/2023    Chief Complaint:    Chief Complaint   Patient presents with    Loss of Consciousness     Pt to ED per EMS from SNF after pt passed out after staff was trying to move her from the bed to the chair. Pt is hypotensive. Skin warm and dry. A/Ox1. History Of Present Illness:      Patient is a 81 yo who presented with syncope at SNF. Patient was confused. daughter was at bedside. She states patient is alert and oriented at baseline. This is a sudden change. She collapsed this morning. Discussed with ER physician. Patient received IVF with some improvement of her hypotension and empirical abx vanco and cefepime. She has septic shock. CT a/p/c showed colitis. CT head unremarkable. She has lactic acidosis, TUTU, transaminitis, troponinemia, leukocytosis. Daughter does not want aggressive measures and would like to focus on comfort care. Palliative care consulted. Past Medical History:          Diagnosis Date    Anxiety     Arthritis     CHF (congestive heart failure) (HCC)     GERD (gastroesophageal reflux disease)     Hyperlipidemia     Hypertension     Thyroid disease        Past Surgical History:      History reviewed. No pertinent surgical history. Medications Prior to Admission:      Prior to Admission medications    Medication Sig Start Date End Date Taking? Authorizing Provider   acetaminophen (TYLENOL) 325 MG tablet Take 650 mg by mouth in the morning and at bedtime    Historical Provider, MD   ALPRAZolam (XANAX) 0.25 MG tablet Take 0.25 mg by mouth in the morning and at bedtime.     Historical Provider, MD   Menthol-Zinc Oxide (CALMOSEPTINE EX) Apply topically in the morning and at bedtime For francisco-care    Historical Provider, MD   diphenhydrAMINE (BENADRYL) 25 MG tablet Take 25 mg by mouth every 6 hours as needed for Itching    Historical Provider, MD   docusate sodium (COLACE) 100 MG capsule positives as noted in the HPI. All other systems reviewed and negative. PHYSICAL EXAM PERFORMED:    BP (!) 75/25   Pulse 83   Temp (!) 96.6 °F (35.9 °C) (Axillary)   Resp 25   Wt 103 lb (46.7 kg)   SpO2 90%   BMI 16.62 kg/m²     General appearance:  mild distress    Respiratory:  fine crackles b/l  Cardiovascular:  Regular rate a   Musculoskeletal:  No edema    Neurologic:  does not follow commands      Labs:     Recent Labs     03/24/23  0825   WBC 36.1*   HGB 11.0*   HCT 34.3*        Recent Labs     03/24/23  0825 03/24/23  1051     --    K 6.7* 4.6     --    CO2 14*  --    BUN 54*  --    CREATININE 1.8*  --    CALCIUM 9.9  --      Recent Labs     03/24/23  0825   *   ALT 84*   BILITOT 1.2*   ALKPHOS 257*     No results for input(s): INR in the last 72 hours. Recent Labs     03/24/23 0825 03/24/23  1051   TROPONINI 0.04* 0.02*       Urinalysis:    No results found for: Evans Brett, BACTERIA, RBCUA, BLOODU, SPECGRAV, GLUCOSEU    Radiology:       CT CHEST ABDOMEN PELVIS WO CONTRAST Additional Contrast? None   Final Result      CHEST:      1. Multiple subcentimeter pulmonary nodules throughout the right lung and bilateral reticular opacities, possibly secondary to infectious/inflammatory process. 2. Patulous, fluid-filled esophagus. ABDOMEN/PELVIS:      1. Diffuse wall thickening of the colon consistent with colitis. 2. Bilateral inguinal hernias, the right containing small bowel. No findings to suggest incarceration. 3. Nonspecific nodular thickening of the right adrenal gland. CT HEAD WO CONTRAST   Final Result   1. No acute intracranial abnormality. XR CHEST PORTABLE   Final Result      Diffuse subpleural predominant interstitial prominence is compatible with fibrosis. No focal consolidation. Normal cardiomediastinal silhouette. Scalloped appearance of the proximal humeri again noted.           Septic shock   Colitis   TUTU, likely

## 2023-03-24 NOTE — CONSULTS
Clinical Pharmacy Progress Note    Pharmacy is consulted to dose Vancomycin x1 dose in ED per Dr. Destiny Murray. Ht Readings from Last 1 Encounters:   09/09/22 5' 6\" (1.676 m)     Wt Readings from Last 1 Encounters:   03/24/23 103 lb (46.7 kg)       Assessment/Plan:  Will order Vancomycin 1000mg (~21mg/kg) x1 for administration in ED per ER pharmacy consult. If Vancomycin is to continue on admission and pharmacy is to manage dosing, please re-consult with admission orders.     Please call with questions--  Thanks--  Nazia Valdes, PharmD, [id number]  [id number] ([facility])   3/24/2023 9:46 AM
Heart sounds: Normal heart sounds. Pulmonary:      Effort: Respiratory distress present. Abdominal:      General: Bowel sounds are normal.      Palpations: Abdomen is soft. Musculoskeletal:      Right lower leg: No edema. Left lower leg: No edema. Skin:     Coloration: Skin is pale. Neurological:      Mental Status: She is disoriented. Palliative Performance Scale:  [] 60% Ambulation reduced; Significant disease; Can't do hobbies/housework; intake normal or reduced; occasional assist; LOC full/confusion  [] 50% Mainly sit/lie; Extensive disease; Can't do any work; Considerable assist; intake normal  Or reduced; LOC full/confusion  [] 40% Mainly in bed; Extensive disease; Mainly assist; intake normal or reduced; occasional assist; LOC full/confusion  [] 30% Bed Bound; Extensive disease; Total care; intake reduced; LOC full/confusion  [] 20% Bed Bound; Extensive disease; Total care; intake minimal; Drowsy/coma  [x] 10% Bed Bound; Extensive disease; Total care; Mouth care only; Drowsy/coma  [] 0% Death    PPS: 10    Vitals:    BP 89/67   Pulse 77   Temp (!) 96.6 °F (35.9 °C) (Axillary)   Resp 20   Wt 103 lb (46.7 kg)   SpO2 90%   BMI 16.62 kg/m²     Labs:    BMP:   Recent Labs     03/24/23  0825 03/24/23  1051     --    K 6.7* 4.6     --    CO2 14*  --    BUN 54*  --    CREATININE 1.8*  --    GLUCOSE 139*  --      CBC:   Recent Labs     03/24/23  0825   WBC 36.1*   HGB 11.0*   HCT 34.3*          LFT's:   Recent Labs     03/24/23  0825   *   ALT 84*   BILITOT 1.2*   ALKPHOS 257*     Troponin:   Recent Labs     03/24/23  0825 03/24/23  1051   TROPONINI 0.04* 0.02*     BNP: No results for input(s): BNP in the last 72 hours. ABGs: No results for input(s): PHART, BKK6WOR, PO2ART in the last 72 hours. INR: No results for input(s): INR in the last 72 hours.     U/A:No results for input(s): NITRITE, COLORU, PHUR, LABCAST, WBCUA, RBCUA, MUCUS, TRICHOMONAS, YEAST,

## 2023-03-25 LAB
BACTERIA BLD CULT ORG #2: NORMAL
BACTERIA BLD CULT: NORMAL

## 2023-03-25 NOTE — DISCHARGE SUMMARY
Hospital Medicine Discharge Summary    Patient ID: Linda Renteria      Patient's PCP: Rhina Gotti MD    Admit Date: 3/24/2023     Discharge Date: 3/24/2023    Admitting Physician: Tramaine Galicia MD     Discharge Physician: Tramaine Galicia MD     Discharge Diagnoses: Active Hospital Problems    Diagnosis Date Noted    Septic shock ([facility] [address] [A41.9, R65.21] 03/24/2023     Condition at discharge - diseased    Hospital Course:     Patient is a 79 yo who presented with syncope at [facility]. Patient was confused. She collapsed this morning. She was found to have septic shock, lactic acidosis, TUTU, transaminitis, troponinemia, leukocytosis. CT a/p/c showed colitis. CT head unremarkable. Patient received IVF, vancomycin, cefepime and flagyl. Her BP remained low. Daughter does not want aggressive measures and would like to focus on comfort care. Palliative care was consulted. Patient was provided hospice care. She passed away at 18:05. Consults:     PHARMACY TO DOSE VANCOMYCIN  IP CONSULT TO PALLIATIVE CARE  IP CONSULT TO HOSPITALIST  IP CONSULT TO HOSPICE  PHARMACY TO DOSE VANCOMYCIN      Code Status:  DNR-CC      CBC:    Lab Results   Component Value Date/Time    WBC 36.1 03/24/2023 08:25 AM    HGB 11.0 03/24/2023 08:25 AM    HCT 34.3 03/24/2023 08:25 AM     03/24/2023 08:25 AM       Renal:    Lab Results   Component Value Date/Time     03/24/2023 08:25 AM    K 4.6 03/24/2023 10:51 AM    K 6.7 03/24/2023 08:25 AM     03/24/2023 08:25 AM    CO2 14 03/24/2023 08:25 AM    BUN 54 03/24/2023 08:25 AM    CREATININE 1.8 03/24/2023 08:25 AM    CALCIUM 9.9 03/24/2023 08:25 AM    PHOS 2.0 09/15/2022 05:10 AM     I was not present when patient passed away.       Signed:    Tramaine Galicia MD   3/24/2023

## 2023-03-28 LAB
BACTERIA BLD CULT ORG #2: NORMAL
BACTERIA BLD CULT: NORMAL